# Patient Record
Sex: MALE
[De-identification: names, ages, dates, MRNs, and addresses within clinical notes are randomized per-mention and may not be internally consistent; named-entity substitution may affect disease eponyms.]

---

## 2020-04-07 ENCOUNTER — APPOINTMENT (OUTPATIENT)
Dept: INTERNAL MEDICINE | Facility: CLINIC | Age: 65
End: 2020-04-07

## 2020-05-18 ENCOUNTER — NON-APPOINTMENT (OUTPATIENT)
Age: 65
End: 2020-05-18

## 2020-05-18 ENCOUNTER — APPOINTMENT (OUTPATIENT)
Dept: INTERNAL MEDICINE | Facility: CLINIC | Age: 65
End: 2020-05-18
Payer: COMMERCIAL

## 2020-05-18 VITALS
BODY MASS INDEX: 25.47 KG/M2 | OXYGEN SATURATION: 75 % | DIASTOLIC BLOOD PRESSURE: 70 MMHG | WEIGHT: 188 LBS | HEIGHT: 72 IN | TEMPERATURE: 97.9 F | SYSTOLIC BLOOD PRESSURE: 148 MMHG

## 2020-05-18 VITALS — SYSTOLIC BLOOD PRESSURE: 118 MMHG | DIASTOLIC BLOOD PRESSURE: 78 MMHG

## 2020-05-18 DIAGNOSIS — Z80.42 FAMILY HISTORY OF MALIGNANT NEOPLASM OF PROSTATE: ICD-10-CM

## 2020-05-18 DIAGNOSIS — R80.9 PROTEINURIA, UNSPECIFIED: ICD-10-CM

## 2020-05-18 DIAGNOSIS — Z11.59 ENCOUNTER FOR SCREENING FOR OTHER VIRAL DISEASES: ICD-10-CM

## 2020-05-18 DIAGNOSIS — Z78.9 OTHER SPECIFIED HEALTH STATUS: ICD-10-CM

## 2020-05-18 DIAGNOSIS — Z87.448 PERSONAL HISTORY OF OTHER DISEASES OF URINARY SYSTEM: ICD-10-CM

## 2020-05-18 DIAGNOSIS — M25.512 PAIN IN LEFT SHOULDER: ICD-10-CM

## 2020-05-18 LAB
ALBUMIN SERPL ELPH-MCNC: 4.6 G/DL
ALP BLD-CCNC: 68 U/L
ALT SERPL-CCNC: 9 U/L
ANION GAP SERPL CALC-SCNC: 14 MMOL/L
AST SERPL-CCNC: 13 U/L
BASOPHILS # BLD AUTO: 0.03 K/UL
BASOPHILS NFR BLD AUTO: 0.3 %
BILIRUB SERPL-MCNC: 0.8 MG/DL
BILIRUB UR QL STRIP: NORMAL
BUN SERPL-MCNC: 31 MG/DL
CALCIUM SERPL-MCNC: 9.8 MG/DL
CHLORIDE SERPL-SCNC: 102 MMOL/L
CHOLEST SERPL-MCNC: 142 MG/DL
CHOLEST/HDLC SERPL: 2 RATIO
CLARITY UR: NORMAL
CO2 SERPL-SCNC: 27 MMOL/L
COLLECTION METHOD: NORMAL
CREAT SERPL-MCNC: 1.09 MG/DL
EOSINOPHIL # BLD AUTO: 0.08 K/UL
EOSINOPHIL NFR BLD AUTO: 0.8 %
GLUCOSE SERPL-MCNC: 106 MG/DL
GLUCOSE UR-MCNC: NORMAL
HCG UR QL: 1 EU/DL
HCT VFR BLD CALC: 48.2 %
HDLC SERPL-MCNC: 71 MG/DL
HGB BLD-MCNC: 15.3 G/DL
HGB UR QL STRIP.AUTO: NORMAL
IMM GRANULOCYTES NFR BLD AUTO: 0.5 %
KETONES UR-MCNC: NORMAL
LDLC SERPL CALC-MCNC: 59 MG/DL
LEUKOCYTE ESTERASE UR QL STRIP: NORMAL
LYMPHOCYTES # BLD AUTO: 1.62 K/UL
LYMPHOCYTES NFR BLD AUTO: 16.8 %
MAN DIFF?: NORMAL
MCHC RBC-ENTMCNC: 29 PG
MCHC RBC-ENTMCNC: 31.7 GM/DL
MCV RBC AUTO: 91.5 FL
MONOCYTES # BLD AUTO: 1.05 K/UL
MONOCYTES NFR BLD AUTO: 10.9 %
NEUTROPHILS # BLD AUTO: 6.81 K/UL
NEUTROPHILS NFR BLD AUTO: 70.7 %
NITRITE UR QL STRIP: NORMAL
PH UR STRIP: 5
PLATELET # BLD AUTO: 312 K/UL
POTASSIUM SERPL-SCNC: 4.6 MMOL/L
PROT SERPL-MCNC: 7.6 G/DL
PROT UR STRIP-MCNC: NORMAL
PSA SERPL-MCNC: 0.68 NG/ML
RBC # BLD: 5.27 M/UL
RBC # FLD: 13 %
SODIUM SERPL-SCNC: 143 MMOL/L
SP GR UR STRIP: 1.03
TRIGL SERPL-MCNC: 63 MG/DL
WBC # FLD AUTO: 9.64 K/UL

## 2020-05-18 PROCEDURE — G0009: CPT

## 2020-05-18 PROCEDURE — 36415 COLL VENOUS BLD VENIPUNCTURE: CPT

## 2020-05-18 PROCEDURE — 99204 OFFICE O/P NEW MOD 45 MIN: CPT | Mod: 25

## 2020-05-18 PROCEDURE — 90670 PCV13 VACCINE IM: CPT

## 2020-05-18 PROCEDURE — 93000 ELECTROCARDIOGRAM COMPLETE: CPT | Mod: 59

## 2020-05-18 PROCEDURE — 81003 URINALYSIS AUTO W/O SCOPE: CPT | Mod: QW

## 2020-05-18 PROCEDURE — G0444 DEPRESSION SCREEN ANNUAL: CPT | Mod: 59

## 2020-05-18 NOTE — HEALTH RISK ASSESSMENT
[Good] : ~his/her~  mood as  good [No falls in past year] : Patient reported no falls in the past year [Yes] : Yes [0] : 1) Little interest or pleasure doing things: Not at all (0) [None] : None [] :  [] : No [de-identified] : exercises [de-identified] : good [Change in mental status noted] : No change in mental status noted [Reports changes in dental health] : Reports no changes in dental health [Reports changes in hearing] : Reports no changes in hearing

## 2020-05-19 LAB
APPEARANCE: ABNORMAL
BACTERIA: NEGATIVE
BILIRUBIN URINE: ABNORMAL
BLOOD URINE: NORMAL
COLOR: ABNORMAL
GLUCOSE QUALITATIVE U: NEGATIVE
HYALINE CASTS: 0 /LPF
KETONES URINE: ABNORMAL
LEUKOCYTE ESTERASE URINE: NEGATIVE
MICROSCOPIC-UA: NORMAL
NITRITE URINE: NEGATIVE
PH URINE: 6
PROTEIN URINE: NORMAL
RED BLOOD CELLS URINE: 2 /HPF
SARS-COV-2 IGG SERPL IA-ACNC: 0.1 INDEX
SARS-COV-2 IGG SERPL QL IA: NEGATIVE
SPECIFIC GRAVITY URINE: >=1.03
SQUAMOUS EPITHELIAL CELLS: 1 /HPF
URINE COMMENTS: NORMAL
UROBILINOGEN URINE: NORMAL
WHITE BLOOD CELLS URINE: 1 /HPF

## 2020-05-20 LAB
ESTIMATED AVERAGE GLUCOSE: 103 MG/DL
HBA1C MFR BLD HPLC: 5.2 %

## 2020-06-02 ENCOUNTER — LABORATORY RESULT (OUTPATIENT)
Age: 65
End: 2020-06-02

## 2020-06-03 ENCOUNTER — APPOINTMENT (OUTPATIENT)
Dept: DERMATOLOGY | Facility: CLINIC | Age: 65
End: 2020-06-03
Payer: COMMERCIAL

## 2020-06-03 ENCOUNTER — APPOINTMENT (OUTPATIENT)
Dept: INTERNAL MEDICINE | Facility: CLINIC | Age: 65
End: 2020-06-03
Payer: COMMERCIAL

## 2020-06-03 VITALS — HEIGHT: 72 IN | BODY MASS INDEX: 24.24 KG/M2 | WEIGHT: 179 LBS

## 2020-06-03 VITALS
DIASTOLIC BLOOD PRESSURE: 80 MMHG | WEIGHT: 179 LBS | BODY MASS INDEX: 24.24 KG/M2 | HEART RATE: 73 BPM | SYSTOLIC BLOOD PRESSURE: 133 MMHG | OXYGEN SATURATION: 96 % | HEIGHT: 72 IN | TEMPERATURE: 96.4 F

## 2020-06-03 VITALS — TEMPERATURE: 96.8 F

## 2020-06-03 DIAGNOSIS — Z87.2 PERSONAL HISTORY OF DISEASES OF THE SKIN AND SUBCUTANEOUS TISSUE: ICD-10-CM

## 2020-06-03 DIAGNOSIS — Z85.828 PERSONAL HISTORY OF OTHER MALIGNANT NEOPLASM OF SKIN: ICD-10-CM

## 2020-06-03 DIAGNOSIS — R61 GENERALIZED HYPERHIDROSIS: ICD-10-CM

## 2020-06-03 DIAGNOSIS — D48.5 NEOPLASM OF UNCERTAIN BEHAVIOR OF SKIN: ICD-10-CM

## 2020-06-03 DIAGNOSIS — L30.9 DERMATITIS, UNSPECIFIED: ICD-10-CM

## 2020-06-03 DIAGNOSIS — Z84.0 FAMILY HISTORY OF DISEASES OF THE SKIN AND SUBCUTANEOUS TISSUE: ICD-10-CM

## 2020-06-03 PROCEDURE — 99214 OFFICE O/P EST MOD 30 MIN: CPT | Mod: 25

## 2020-06-03 PROCEDURE — 11105 PUNCH BX SKIN EA SEP/ADDL: CPT | Mod: 59

## 2020-06-03 PROCEDURE — 11104 PUNCH BX SKIN SINGLE LESION: CPT | Mod: 59

## 2020-06-03 PROCEDURE — 99204 OFFICE O/P NEW MOD 45 MIN: CPT | Mod: 25

## 2020-06-03 PROCEDURE — 17003 DESTRUCT PREMALG LES 2-14: CPT | Mod: 59

## 2020-06-03 PROCEDURE — 17000 DESTRUCT PREMALG LESION: CPT | Mod: 59

## 2020-06-03 PROCEDURE — 36415 COLL VENOUS BLD VENIPUNCTURE: CPT

## 2020-06-03 NOTE — HISTORY OF PRESENT ILLNESS
[FreeTextEntry1] : Having "stomach problems" Has been a slow gradual build up fro 3 weeks. lost 20 pounds Watery BM's appetite is poor. Lower abdominal pain Burning feeling. and night sweats.  He tried tagamet helped gas somewhat.  began after eating [employer] out. \par No recent antibiotic use, Low grade fever 101. he never had colonoscopy. Cologuard test was negative. No nausea or vomiting. he has a fear of eating. \par  he ahs otherwise been healthy most of his life

## 2020-06-03 NOTE — PHYSICAL EXAM
[General Appearance - Alert] : alert [Sclera] : the sclera and conjunctiva were normal [General Appearance - In No Acute Distress] : in no acute distress [Extraocular Movements] : extraocular movements were intact [PERRL With Normal Accommodation] : pupils were equal in size, round, and reactive to light [Outer Ear] : the ears and nose were normal in appearance [Oropharynx] : the oropharynx was normal [Neck Appearance] : the appearance of the neck was normal [Neck Cervical Mass (___cm)] : no neck mass was observed [Thyroid Diffuse Enlargement] : the thyroid was not enlarged [Jugular Venous Distention Increased] : there was no jugular-venous distention [Thyroid Nodule] : there were no palpable thyroid nodules [Auscultation Breath Sounds / Voice Sounds] : lungs were clear to auscultation bilaterally [Heart Rate And Rhythm] : heart rate was normal and rhythm regular [Heart Sounds] : normal S1 and S2 [Heart Sounds Gallop] : no gallops [Heart Sounds Pericardial Friction Rub] : no pericardial rub [Murmurs] : no murmurs [Full Pulse] : the pedal pulses are present [Edema] : there was no peripheral edema [Breast Palpation Mass] : no palpable masses [Breast Appearance] : normal in appearance [Abdomen Soft] : soft [Bowel Sounds] : normal bowel sounds [Abdomen Mass (___ Cm)] : no abdominal mass palpated [] : no hepato-splenomegaly [Abdomen Tenderness] : non-tender [Penis Abnormality] : the penis was normal [Scrotum] : the scrotum was normal [Testes Mass (___cm)] : there were no testicular masses [Testes Swelling] : the testicles were not swollen [Cervical Lymph Nodes Enlarged Posterior Bilaterally] : posterior cervical [Cervical Lymph Nodes Enlarged Anterior Bilaterally] : anterior cervical [Axillary Lymph Nodes Enlarged Bilaterally] : axillary [Femoral Lymph Nodes Enlarged Bilaterally] : femoral [Inguinal Lymph Nodes Enlarged Bilaterally] : inguinal [Supraclavicular Lymph Nodes Enlarged Bilaterally] : supraclavicular [No CVA Tenderness] : no ~M costovertebral angle tenderness [Abnormal Walk] : normal gait [No Spinal Tenderness] : no spinal tenderness [Nail Clubbing] : no clubbing  or cyanosis of the fingernails [Musculoskeletal - Swelling] : no joint swelling seen [Motor Tone] : muscle strength and tone were normal [FreeTextEntry1] : rash on chest wall [Deep Tendon Reflexes (DTR)] : deep tendon reflexes were 2+ and symmetric [Sensation] : the sensory exam was normal to light touch and pinprick [No Focal Deficits] : no focal deficits [Oriented To Time, Place, And Person] : oriented to person, place, and time [Impaired Insight] : insight and judgment were intact [Affect] : the affect was normal

## 2020-06-03 NOTE — ASSESSMENT
[FreeTextEntry1] : unexplained weight loss with abdominal pain and fever with chills and night sweats .

## 2020-06-04 LAB
ALBUMIN SERPL ELPH-MCNC: 3.9 G/DL
ALP BLD-CCNC: 87 U/L
ALT SERPL-CCNC: 41 U/L
ANION GAP SERPL CALC-SCNC: 15 MMOL/L
AST SERPL-CCNC: 28 U/L
BASOPHILS # BLD AUTO: 0.05 K/UL
BASOPHILS NFR BLD AUTO: 0.5 %
BILIRUB SERPL-MCNC: 0.3 MG/DL
BUN SERPL-MCNC: 24 MG/DL
CALCIUM SERPL-MCNC: 9.7 MG/DL
CHLORIDE SERPL-SCNC: 101 MMOL/L
CO2 SERPL-SCNC: 25 MMOL/L
CREAT SERPL-MCNC: 0.84 MG/DL
CRP SERPL-MCNC: 13.29 MG/DL
EOSINOPHIL # BLD AUTO: 0.08 K/UL
EOSINOPHIL NFR BLD AUTO: 0.7 %
ERYTHROCYTE [SEDIMENTATION RATE] IN BLOOD BY WESTERGREN METHOD: 60 MM/HR
GLUCOSE SERPL-MCNC: 114 MG/DL
HCT VFR BLD CALC: 47.6 %
HGB BLD-MCNC: 14.7 G/DL
IMM GRANULOCYTES NFR BLD AUTO: 0.6 %
LYMPHOCYTES # BLD AUTO: 1.81 K/UL
LYMPHOCYTES NFR BLD AUTO: 16.4 %
MAN DIFF?: NORMAL
MCHC RBC-ENTMCNC: 28.6 PG
MCHC RBC-ENTMCNC: 30.9 GM/DL
MCV RBC AUTO: 92.6 FL
MONOCYTES # BLD AUTO: 1.04 K/UL
MONOCYTES NFR BLD AUTO: 9.4 %
NEUTROPHILS # BLD AUTO: 7.96 K/UL
NEUTROPHILS NFR BLD AUTO: 72.4 %
PLATELET # BLD AUTO: 447 K/UL
POTASSIUM SERPL-SCNC: 4.6 MMOL/L
PROT SERPL-MCNC: 7.3 G/DL
RBC # BLD: 5.14 M/UL
RBC # FLD: 12.3 %
SAVE SPECIMEN: NORMAL
SODIUM SERPL-SCNC: 141 MMOL/L
WBC # FLD AUTO: 11.01 K/UL

## 2020-06-06 RX ORDER — AZITHROMYCIN 500 MG/1
500 TABLET, FILM COATED ORAL DAILY
Qty: 1 | Refills: 0 | Status: DISCONTINUED | COMMUNITY
Start: 2020-06-06 | End: 2020-06-06

## 2020-06-06 RX ORDER — AMOXICILLIN AND CLAVULANATE POTASSIUM 875; 125 MG/1; MG/1
875-125 TABLET, COATED ORAL
Qty: 20 | Refills: 0 | Status: DISCONTINUED | COMMUNITY
Start: 2020-06-04 | End: 2020-06-06

## 2020-06-08 LAB
C DIFF TOX GENS STL QL NAA+PROBE: NORMAL
CDIFF BY PCR: NOT DETECTED
GI PCR PANEL, STOOL: NORMAL

## 2020-06-11 ENCOUNTER — OUTPATIENT (OUTPATIENT)
Dept: OUTPATIENT SERVICES | Facility: HOSPITAL | Age: 65
LOS: 1 days | End: 2020-06-11
Payer: COMMERCIAL

## 2020-06-11 ENCOUNTER — APPOINTMENT (OUTPATIENT)
Dept: CT IMAGING | Facility: CLINIC | Age: 65
End: 2020-06-11
Payer: COMMERCIAL

## 2020-06-11 DIAGNOSIS — R10.9 UNSPECIFIED ABDOMINAL PAIN: ICD-10-CM

## 2020-06-11 PROCEDURE — 74177 CT ABD & PELVIS W/CONTRAST: CPT

## 2020-06-11 PROCEDURE — 74177 CT ABD & PELVIS W/CONTRAST: CPT | Mod: 26

## 2020-06-18 ENCOUNTER — APPOINTMENT (OUTPATIENT)
Dept: INTERNAL MEDICINE | Facility: CLINIC | Age: 65
End: 2020-06-18

## 2020-06-19 ENCOUNTER — APPOINTMENT (OUTPATIENT)
Dept: COLORECTAL SURGERY | Facility: CLINIC | Age: 65
End: 2020-06-19
Payer: COMMERCIAL

## 2020-06-19 VITALS
DIASTOLIC BLOOD PRESSURE: 73 MMHG | RESPIRATION RATE: 14 BRPM | HEART RATE: 65 BPM | OXYGEN SATURATION: 97 % | TEMPERATURE: 98.4 F | HEIGHT: 72 IN | SYSTOLIC BLOOD PRESSURE: 139 MMHG | BODY MASS INDEX: 24.92 KG/M2 | WEIGHT: 184 LBS

## 2020-06-19 DIAGNOSIS — Z80.8 FAMILY HISTORY OF MALIGNANT NEOPLASM OF OTHER ORGANS OR SYSTEMS: ICD-10-CM

## 2020-06-19 DIAGNOSIS — Z82.49 FAMILY HISTORY OF ISCHEMIC HEART DISEASE AND OTHER DISEASES OF THE CIRCULATORY SYSTEM: ICD-10-CM

## 2020-06-19 DIAGNOSIS — M50.223 OTHER CERVICAL DISC DISPLACEMENT AT C6-C7 LEVEL: ICD-10-CM

## 2020-06-19 PROCEDURE — 99204 OFFICE O/P NEW MOD 45 MIN: CPT

## 2020-06-19 NOTE — PHYSICAL EXAM
[Normal Breath Sounds] : Normal breath sounds [Normal Heart Sounds] : normal heart sounds [Normal Rate and Rhythm] : normal rate and rhythm [Oriented to Place] : oriented to place [Oriented to Person] : oriented to person [Alert] : alert [Oriented to Time] : oriented to time [Calm] : calm [Exam Deferred] : exam was deferred [de-identified] : well appearing [de-identified] : soft, +BS, NT/ND [de-identified] : NC/AT [de-identified] : intact [de-identified] : +ROM/ROLANDO

## 2020-06-19 NOTE — HISTORY OF PRESENT ILLNESS
[FreeTextEntry1] : Patient is a 64 yo male here to discuss diverticulitis episode. 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 He developed abdominal cramping, bloating, gas, constipation, diarrhea, night sweats, had decreased appetite. Patient reports a weight loss of 10-15 pounds in the last months since this began Saw Dr. Francisco was prescribed a course of abx and had CT of abdomen 6/11 showing diverticulitis. Patient has never had a colonoscopy, last cologuard in 2016 was negative. \par He currently has no abdominal pain and is having normal formed BMs

## 2020-06-19 NOTE — ASSESSMENT
[FreeTextEntry1] : Pt has clinically improved significantly on antibiotics. He has no symptoms of a SB-colonic fistula (diarrhea or watery stool). \par I recommend a colonoscopy for which he will f/u with Dr. Francisco. If colonoscopy is normal and pt remains asymptomatic I would continue to monitor. If he develops recurrent symptoms then I would repeat a CT scan with rectal contrast to eval for fistula. I would not recommend surgery at this point unless there is a concerning finding on colonoscopy or he has another episode of diverticulitis, or develops symptoms from a fistula.

## 2020-07-20 LAB
ALBUMIN MFR SERPL ELPH: 58.9 %
ALBUMIN SERPL-MCNC: 3.9 G/DL
ALBUMIN/GLOB SERPL: 1.4 RATIO
ALPHA1 GLOB MFR SERPL ELPH: 4.2 %
ALPHA1 GLOB SERPL ELPH-MCNC: 0.3 G/DL
ALPHA2 GLOB MFR SERPL ELPH: 9.9 %
ALPHA2 GLOB SERPL ELPH-MCNC: 0.7 G/DL
B-GLOBULIN MFR SERPL ELPH: 10.7 %
B-GLOBULIN SERPL ELPH-MCNC: 0.7 G/DL
GAMMA GLOB FLD ELPH-MCNC: 1.1 G/DL
GAMMA GLOB MFR SERPL ELPH: 16.3 %
INTERPRETATION SERPL IEP-IMP: NORMAL
PROT SERPL-MCNC: 6.7 G/DL
PROT SERPL-MCNC: 6.7 G/DL

## 2020-07-22 ENCOUNTER — APPOINTMENT (OUTPATIENT)
Dept: DERMATOLOGY | Facility: CLINIC | Age: 65
End: 2020-07-22
Payer: COMMERCIAL

## 2020-07-22 VITALS — TEMPERATURE: 97.1 F

## 2020-07-22 PROCEDURE — 99213 OFFICE O/P EST LOW 20 MIN: CPT

## 2020-08-03 DIAGNOSIS — Z12.11 ENCOUNTER FOR SCREENING FOR MALIGNANT NEOPLASM OF COLON: ICD-10-CM

## 2020-08-05 ENCOUNTER — TRANSCRIPTION ENCOUNTER (OUTPATIENT)
Age: 65
End: 2020-08-05

## 2020-08-07 ENCOUNTER — APPOINTMENT (OUTPATIENT)
Dept: INTERNAL MEDICINE | Facility: AMBULATORY MEDICAL SERVICES | Age: 65
End: 2020-08-07
Payer: COMMERCIAL

## 2020-08-07 ENCOUNTER — RESULT REVIEW (OUTPATIENT)
Age: 65
End: 2020-08-07

## 2020-08-07 PROCEDURE — 45380 COLONOSCOPY AND BIOPSY: CPT | Mod: PT

## 2020-09-16 ENCOUNTER — APPOINTMENT (OUTPATIENT)
Dept: INTERNAL MEDICINE | Facility: CLINIC | Age: 65
End: 2020-09-16
Payer: COMMERCIAL

## 2020-09-16 VITALS
DIASTOLIC BLOOD PRESSURE: 74 MMHG | HEART RATE: 64 BPM | WEIGHT: 184 LBS | SYSTOLIC BLOOD PRESSURE: 141 MMHG | HEIGHT: 72 IN | BODY MASS INDEX: 24.92 KG/M2

## 2020-09-16 PROCEDURE — 99213 OFFICE O/P EST LOW 20 MIN: CPT

## 2020-09-16 NOTE — PHYSICAL EXAM
[No Acute Distress] : no acute distress [Well Nourished] : well nourished [Well Developed] : well developed [Well-Appearing] : well-appearing [Normal Sclera/Conjunctiva] : normal sclera/conjunctiva [PERRL] : pupils equal round and reactive to light [EOMI] : extraocular movements intact [Normal Oropharynx] : the oropharynx was normal [Normal Outer Ear/Nose] : the outer ears and nose were normal in appearance [No JVD] : no jugular venous distention [No Lymphadenopathy] : no lymphadenopathy [Thyroid Normal, No Nodules] : the thyroid was normal and there were no nodules present [No Respiratory Distress] : no respiratory distress  [Supple] : supple [No Accessory Muscle Use] : no accessory muscle use [Clear to Auscultation] : lungs were clear to auscultation bilaterally [Normal Rate] : normal rate  [Regular Rhythm] : with a regular rhythm [No Carotid Bruits] : no carotid bruits [Normal S1, S2] : normal S1 and S2 [No Murmur] : no murmur heard [No Varicosities] : no varicosities [No Abdominal Bruit] : a ~M bruit was not heard ~T in the abdomen [Pedal Pulses Present] : the pedal pulses are present [No Edema] : there was no peripheral edema [No Palpable Aorta] : no palpable aorta [No Extremity Clubbing/Cyanosis] : no extremity clubbing/cyanosis [Soft] : abdomen soft [Non-distended] : non-distended [Non Tender] : non-tender [No HSM] : no HSM [No Masses] : no abdominal mass palpated [Normal Bowel Sounds] : normal bowel sounds [Normal Posterior Cervical Nodes] : no posterior cervical lymphadenopathy [Normal Anterior Cervical Nodes] : no anterior cervical lymphadenopathy [No CVA Tenderness] : no CVA  tenderness [No Spinal Tenderness] : no spinal tenderness [No Joint Swelling] : no joint swelling [Grossly Normal Strength/Tone] : grossly normal strength/tone [No Rash] : no rash [No Focal Deficits] : no focal deficits [Coordination Grossly Intact] : coordination grossly intact [Normal Gait] : normal gait [Normal Insight/Judgement] : insight and judgment were intact [Normal Affect] : the affect was normal

## 2020-09-16 NOTE — HISTORY OF PRESENT ILLNESS
[FreeTextEntry1] : Diverticulitis in June took clindamycin for 2 weeks and got netter. Had colonscopy in August , and since then he has had only loose BM's

## 2020-09-18 LAB
C DIFF TOX GENS STL QL NAA+PROBE: NORMAL
CDIFF BY PCR: DETECTED

## 2020-10-01 RX ORDER — SODIUM PICOSULFATE, MAGNESIUM OXIDE, AND ANHYDROUS CITRIC ACID 10; 3.5; 12 MG/160ML; G/160ML; G/160ML
10-3.5-12 MG-GM LIQUID ORAL
Qty: 1 | Refills: 0 | Status: DISCONTINUED | COMMUNITY
Start: 2020-08-03 | End: 2020-10-01

## 2020-10-01 RX ORDER — CLINDAMYCIN HYDROCHLORIDE 300 MG/1
300 CAPSULE ORAL 3 TIMES DAILY
Qty: 60 | Refills: 0 | Status: DISCONTINUED | COMMUNITY
Start: 2020-06-11 | End: 2020-10-01

## 2020-10-01 RX ORDER — AMOXICILLIN 500 MG/1
500 TABLET, FILM COATED ORAL EVERY 8 HOURS
Qty: 21 | Refills: 0 | Status: DISCONTINUED | COMMUNITY
Start: 2020-06-06 | End: 2020-10-01

## 2020-10-07 LAB — GI PCR PANEL, STOOL: NORMAL

## 2020-10-12 LAB
C DIFF TOX GENS STL QL NAA+PROBE: NORMAL
CDIFF BY PCR: NOT DETECTED

## 2020-10-15 ENCOUNTER — APPOINTMENT (OUTPATIENT)
Dept: CT IMAGING | Facility: CLINIC | Age: 65
End: 2020-10-15
Payer: COMMERCIAL

## 2020-10-15 ENCOUNTER — OUTPATIENT (OUTPATIENT)
Dept: OUTPATIENT SERVICES | Facility: HOSPITAL | Age: 65
LOS: 1 days | End: 2020-10-15
Payer: COMMERCIAL

## 2020-10-15 ENCOUNTER — RESULT REVIEW (OUTPATIENT)
Age: 65
End: 2020-10-15

## 2020-10-15 DIAGNOSIS — K57.32 DIVERTICULITIS OF LARGE INTESTINE WITHOUT PERFORATION OR ABSCESS WITHOUT BLEEDING: ICD-10-CM

## 2020-10-15 PROCEDURE — 74177 CT ABD & PELVIS W/CONTRAST: CPT | Mod: 26

## 2020-10-15 PROCEDURE — 82565 ASSAY OF CREATININE: CPT

## 2020-10-15 PROCEDURE — 74177 CT ABD & PELVIS W/CONTRAST: CPT

## 2020-10-19 ENCOUNTER — APPOINTMENT (OUTPATIENT)
Dept: ORTHOPEDIC SURGERY | Facility: CLINIC | Age: 65
End: 2020-10-19

## 2020-10-21 RX ORDER — VANCOMYCIN HYDROCHLORIDE 250 MG/1
250 CAPSULE ORAL
Qty: 40 | Refills: 0 | Status: DISCONTINUED | COMMUNITY
Start: 2020-09-18 | End: 2020-10-21

## 2020-10-23 ENCOUNTER — APPOINTMENT (OUTPATIENT)
Dept: ORTHOPEDIC SURGERY | Facility: CLINIC | Age: 65
End: 2020-10-23

## 2020-11-05 ENCOUNTER — APPOINTMENT (OUTPATIENT)
Dept: INTERNAL MEDICINE | Facility: CLINIC | Age: 65
End: 2020-11-05
Payer: COMMERCIAL

## 2020-11-05 ENCOUNTER — LABORATORY RESULT (OUTPATIENT)
Age: 65
End: 2020-11-05

## 2020-11-05 VITALS
SYSTOLIC BLOOD PRESSURE: 146 MMHG | BODY MASS INDEX: 25.47 KG/M2 | WEIGHT: 188 LBS | DIASTOLIC BLOOD PRESSURE: 70 MMHG | HEART RATE: 70 BPM | HEIGHT: 72 IN

## 2020-11-05 DIAGNOSIS — Z23 ENCOUNTER FOR IMMUNIZATION: ICD-10-CM

## 2020-11-05 DIAGNOSIS — K90.0 CELIAC DISEASE: ICD-10-CM

## 2020-11-05 PROCEDURE — 90662 IIV NO PRSV INCREASED AG IM: CPT

## 2020-11-05 PROCEDURE — 99072 ADDL SUPL MATRL&STAF TM PHE: CPT

## 2020-11-05 PROCEDURE — 99214 OFFICE O/P EST MOD 30 MIN: CPT | Mod: 25

## 2020-11-05 PROCEDURE — G0008: CPT

## 2020-11-05 PROCEDURE — 36415 COLL VENOUS BLD VENIPUNCTURE: CPT

## 2020-11-05 NOTE — HISTORY OF PRESENT ILLNESS
[FreeTextEntry1] : 6/2020 - Diverticulitis - treated with antibiotics initially started Augmentin Got sick from it. Then just amoxicillin and eventually p[laced on Clindamycin and got better.  CT scan showed major inflammation- but he eventually got better,. Had colonoscopy which was normal. after the colonoscopy - developed diarrhea and diagnosed with c.dif colitis and treated Vancomycin. repeat C.dif was negative  But currently he is still having watery diarrhea with clear mucous. 6-8 loose BM's a day mostly in AM had repeat CT 10/12. resolved collections and abcesses , but still soft tissue thickening /inflammatory changes adjacent to sigmoid colon. No pain or fever

## 2020-11-10 ENCOUNTER — LABORATORY RESULT (OUTPATIENT)
Age: 65
End: 2020-11-10

## 2020-11-13 LAB
ALBUMIN SERPL ELPH-MCNC: 4.2 G/DL
ALP BLD-CCNC: 78 U/L
ALT SERPL-CCNC: 8 U/L
ANION GAP SERPL CALC-SCNC: 12 MMOL/L
AST SERPL-CCNC: 12 U/L
BAKER'S YEAST AB QL: 8.9 UNITS
BAKER'S YEAST IGA QL IA: 6.2 UNITS
BAKER'S YEAST IGA QN IA: NEGATIVE
BAKER'S YEAST IGG QN IA: NEGATIVE
BASOPHILS # BLD AUTO: 0.03 K/UL
BASOPHILS NFR BLD AUTO: 0.3 %
BILIRUB SERPL-MCNC: 0.6 MG/DL
BUN SERPL-MCNC: 27 MG/DL
CALCIUM SERPL-MCNC: 9.3 MG/DL
CHLORIDE SERPL-SCNC: 100 MMOL/L
CO2 SERPL-SCNC: 28 MMOL/L
CREAT SERPL-MCNC: 0.92 MG/DL
CRP SERPL-MCNC: 4.81 MG/DL
EOSINOPHIL # BLD AUTO: 0.18 K/UL
EOSINOPHIL NFR BLD AUTO: 2 %
ERYTHROCYTE [SEDIMENTATION RATE] IN BLOOD BY WESTERGREN METHOD: 45 MM/HR
GLUCOSE SERPL-MCNC: 90 MG/DL
HCT VFR BLD CALC: 43.4 %
HGB BLD-MCNC: 13.8 G/DL
IGA SER QL IEP: 269 MG/DL
IMM GRANULOCYTES NFR BLD AUTO: 0.3 %
LYMPHOCYTES # BLD AUTO: 2.34 K/UL
LYMPHOCYTES NFR BLD AUTO: 26.4 %
MAN DIFF?: NORMAL
MCHC RBC-ENTMCNC: 28 PG
MCHC RBC-ENTMCNC: 31.8 GM/DL
MCV RBC AUTO: 88 FL
MONOCYTES # BLD AUTO: 0.82 K/UL
MONOCYTES NFR BLD AUTO: 9.3 %
MPO AB + PR3 PNL SER: NORMAL
NEUTROPHILS # BLD AUTO: 5.45 K/UL
NEUTROPHILS NFR BLD AUTO: 61.7 %
PLATELET # BLD AUTO: 320 K/UL
POTASSIUM SERPL-SCNC: 4.3 MMOL/L
PROT SERPL-MCNC: 7.1 G/DL
RBC # BLD: 4.93 M/UL
RBC # FLD: 13.4 %
SODIUM SERPL-SCNC: 140 MMOL/L
TTG IGA SER IA-ACNC: <1.2 U/ML
TTG IGA SER-ACNC: NEGATIVE
TTG IGG SER IA-ACNC: 6.9 U/ML
TTG IGG SER IA-ACNC: ABNORMAL
WBC # FLD AUTO: 8.85 K/UL

## 2020-11-25 ENCOUNTER — APPOINTMENT (OUTPATIENT)
Dept: INTERNAL MEDICINE | Facility: CLINIC | Age: 65
End: 2020-11-25
Payer: COMMERCIAL

## 2020-11-25 VITALS
BODY MASS INDEX: 25.47 KG/M2 | HEIGHT: 72 IN | WEIGHT: 188 LBS | SYSTOLIC BLOOD PRESSURE: 135 MMHG | DIASTOLIC BLOOD PRESSURE: 74 MMHG | HEART RATE: 56 BPM

## 2020-11-25 PROCEDURE — 36415 COLL VENOUS BLD VENIPUNCTURE: CPT

## 2020-11-25 PROCEDURE — 99214 OFFICE O/P EST MOD 30 MIN: CPT | Mod: 25

## 2020-11-25 NOTE — PLAN
[FreeTextEntry1] : patient made aware to watch for reccurrence of c.dif. and to consider in the future sigmoid colon

## 2020-11-25 NOTE — HISTORY OF PRESENT ILLNESS
[FreeTextEntry1] : completed course of antibiotics on Monday. No more diarrhea, no more fever chills . \par

## 2020-11-30 LAB
BASOPHILS # BLD AUTO: 0.04 K/UL
BASOPHILS NFR BLD AUTO: 0.5 %
CRP SERPL-MCNC: 1.9 MG/DL
EOSINOPHIL # BLD AUTO: 0.16 K/UL
EOSINOPHIL NFR BLD AUTO: 2 %
ERYTHROCYTE [SEDIMENTATION RATE] IN BLOOD BY WESTERGREN METHOD: 41 MM/HR
HCT VFR BLD CALC: 42.2 %
HGB BLD-MCNC: 13 G/DL
IMM GRANULOCYTES NFR BLD AUTO: 0.2 %
LYMPHOCYTES # BLD AUTO: 2.05 K/UL
LYMPHOCYTES NFR BLD AUTO: 25.6 %
MAN DIFF?: NORMAL
MCHC RBC-ENTMCNC: 28.5 PG
MCHC RBC-ENTMCNC: 30.8 GM/DL
MCV RBC AUTO: 92.5 FL
MONOCYTES # BLD AUTO: 0.74 K/UL
MONOCYTES NFR BLD AUTO: 9.2 %
NEUTROPHILS # BLD AUTO: 5 K/UL
NEUTROPHILS NFR BLD AUTO: 62.5 %
PLATELET # BLD AUTO: 379 K/UL
RBC # BLD: 4.56 M/UL
RBC # FLD: 14.1 %
WBC # FLD AUTO: 8.01 K/UL

## 2020-12-14 LAB
C DIFF TOX GENS STL QL NAA+PROBE: NORMAL
CDIFF BY PCR: NOT DETECTED

## 2020-12-21 ENCOUNTER — NON-APPOINTMENT (OUTPATIENT)
Age: 65
End: 2020-12-21

## 2020-12-23 PROBLEM — Z12.11 ENCOUNTER FOR SCREENING COLONOSCOPY: Status: RESOLVED | Noted: 2020-06-28 | Resolved: 2020-12-23

## 2021-01-19 ENCOUNTER — APPOINTMENT (OUTPATIENT)
Dept: GASTROENTEROLOGY | Facility: CLINIC | Age: 66
End: 2021-01-19
Payer: COMMERCIAL

## 2021-01-19 VITALS
DIASTOLIC BLOOD PRESSURE: 67 MMHG | HEART RATE: 68 BPM | WEIGHT: 192 LBS | TEMPERATURE: 97.3 F | HEIGHT: 72 IN | BODY MASS INDEX: 26.01 KG/M2 | SYSTOLIC BLOOD PRESSURE: 143 MMHG

## 2021-01-19 DIAGNOSIS — R21 RASH AND OTHER NONSPECIFIC SKIN ERUPTION: ICD-10-CM

## 2021-01-19 PROCEDURE — 82274 ASSAY TEST FOR BLOOD FECAL: CPT | Mod: QW

## 2021-01-19 PROCEDURE — 99204 OFFICE O/P NEW MOD 45 MIN: CPT

## 2021-01-19 PROCEDURE — 99072 ADDL SUPL MATRL&STAF TM PHE: CPT

## 2021-01-19 RX ORDER — TRIAMCINOLONE ACETONIDE 1 MG/G
0.1 OINTMENT TOPICAL
Qty: 1 | Refills: 1 | Status: DISCONTINUED | COMMUNITY
Start: 2020-06-03 | End: 2021-01-19

## 2021-01-19 RX ORDER — CEPHALEXIN 500 MG/1
500 CAPSULE ORAL
Qty: 40 | Refills: 0 | Status: DISCONTINUED | COMMUNITY
Start: 2020-11-13 | End: 2021-01-19

## 2021-01-19 NOTE — PHYSICAL EXAM
[General Appearance - Alert] : alert [General Appearance - In No Acute Distress] : in no acute distress [General Appearance - Well Nourished] : well nourished [General Appearance - Well Developed] : well developed [General Appearance - Well-Appearing] : healthy appearing [Sclera] : the sclera and conjunctiva were normal [Extraocular Movements] : extraocular movements were intact [PERRL With Normal Accommodation] : pupils were equal in size, round, and reactive to light [Strabismus] : no strabismus was seen [Optic Disc Abnormality] : the optic disc were normal in size and color [Outer Ear] : the ears and nose were normal in appearance [Retina] : no retinal hemorrhages, vessel changes or exudates seen on fundoscopic exam [Examination Of The Oral Cavity] : the lips and gums were normal [Both Tympanic Membranes Were Examined] : both tympanic membranes were normal [Nasal Cavity] : the nasal mucosa and septum were normal [Oropharynx] : the oropharynx was normal [Neck Appearance] : the appearance of the neck was normal [Neck Cervical Mass (___cm)] : no neck mass was observed [Jugular Venous Distention Increased] : there was no jugular-venous distention [Thyroid Diffuse Enlargement] : the thyroid was not enlarged [Thyroid Nodule] : there were no palpable thyroid nodules [Auscultation Breath Sounds / Voice Sounds] : lungs were clear to auscultation bilaterally [Lungs Percussion] : the lungs were normal to percussion [Heart Rate And Rhythm] : heart rate was normal and rhythm regular [Heart Sounds] : normal S1 and S2 [Heart Sounds Gallop] : no gallops [Murmurs] : no murmurs [Heart Sounds Pericardial Friction Rub] : no pericardial rub [Arterial Pulses Carotid] : carotid pulses were normal with no bruits [Abdominal Aorta] : the abdominal aorta was normal [Edema] : there was no peripheral edema [Full Pulse] : the pedal pulses are present [Veins - Varicosity Changes] : there were no varicosital changes [Abdomen Soft] : soft [Abdomen Tenderness] : non-tender [] : no hepato-splenomegaly [Abdomen Mass (___ Cm)] : no abdominal mass palpated [Abdomen Hernia] : no hernia was discovered [Normal Sphincter Tone] : normal sphincter tone [No Rectal Mass] : no rectal mass [Occult Blood Positive] : stool was negative for occult blood [Penis Abnormality] : the penis was normal [Scrotum] : the scrotum was normal [Testes Swelling] : the testicles were not swollen [Testes Mass (___cm)] : there were no testicular masses [Cervical Lymph Nodes Enlarged Posterior Bilaterally] : posterior cervical [Cervical Lymph Nodes Enlarged Anterior Bilaterally] : anterior cervical [Supraclavicular Lymph Nodes Enlarged Bilaterally] : supraclavicular [Axillary Lymph Nodes Enlarged Bilaterally] : axillary [Femoral Lymph Nodes Enlarged Bilaterally] : femoral [Inguinal Lymph Nodes Enlarged Bilaterally] : inguinal [No CVA Tenderness] : no ~M costovertebral angle tenderness [No Spinal Tenderness] : no spinal tenderness [Abnormal Walk] : normal gait [Nail Clubbing] : no clubbing  or cyanosis of the fingernails [Involuntary Movements] : no involuntary movements were seen [Musculoskeletal - Swelling] : no joint swelling seen [Motor Tone] : muscle strength and tone were normal [Skin Turgor] : normal skin turgor [Skin Color & Pigmentation] : normal skin color and pigmentation [FreeTextEntry1] : Erythematous groin rash bilaterally insistent with a fungal infection [No Focal Deficits] : no focal deficits [Oriented To Time, Place, And Person] : oriented to person, place, and time [Impaired Insight] : insight and judgment were intact [Affect] : the affect was normal

## 2021-01-19 NOTE — CONSULT LETTER
[Dear  ___] : Dear  [unfilled], [Consult Letter:] : I had the pleasure of evaluating your patient, [unfilled]. [( Thank you for referring [unfilled] for consultation for _____ )] : Thank you for referring [unfilled] for consultation for [unfilled] [Please see my note below.] : Please see my note below. [Consult Closing:] : Thank you very much for allowing me to participate in the care of this patient.  If you have any questions, please do not hesitate to contact me. [Sincerely,] : Sincerely, [FreeTextEntry3] : Anibal Louis MD

## 2021-01-19 NOTE — REVIEW OF SYSTEMS
[Recent Weight Gain (___ Lbs)] : recent [unfilled] ~Ulb weight gain [Abdominal Pain] : abdominal pain [Diarrhea] : diarrhea [Arthralgias] : arthralgias [Joint Pain] : joint pain [Joint Swelling] : joint swelling [Joint Stiffness] : joint stiffness [Limb Pain] : limb pain [Limb Swelling] : limb swelling [Negative] : Heme/Lymph

## 2021-01-19 NOTE — HISTORY OF PRESENT ILLNESS
[FreeTextEntry1] : This is a second opinion consultation for this 65-year-old gentleman who had a bout of sigmoid diverticulitis in 2020.  Initially, he was treated with Augmentin, but switched for a brief time to amoxicillin.  This was then finally switched to clindamycin, which he took for 10 days.  During the month of July, he was feeling well.  He then had a colonoscopy by Dr. Dean Francisco on 2020, which revealed a localized colitis in the sigmoid colon, presumably in the area of diverticulitis.  Following the colonoscopy, the patient developed severe diarrhea, which has been present until today.  Stool for C. difficile was positive on 2020.  The patient was treated with vancomycin for 10 days, but the diarrhea persisted.  Stools for other infections were negative and 3 subsequent stool for C. difficile were negative.  The patient saw Dr. Diams Chun, colorectal surgeon, in 2020.  His diarrhea occurs about 7-10 times a day, and he is currently wearing diapers.  He said that he is feeling a little bit better over the past 2 days.  There is no blood in the stool.  There is no prior gastrointestinal history.  The patient's health has been excellent.  He had a right inguinal herniorrhaphy in  with mesh.  His father  at 80 secondary to prostate cancer.  His mother  at 88 secondary to congestive heart failure.  His sister  at 68, most likely secondary to a heart attack, and she had COPD related to smoking.  He is , and his son and daughter are healthy.  He is a retired teacher.  He does not smoke and drinks 2 to 3 glasses of wine each night.  He denies drug allergies, but has an allergy to wheat bread.

## 2021-01-19 NOTE — ASSESSMENT
[FreeTextEntry1] : 1.  Status post acute sigmoid diverticulitis-rule out small bowel fistulization as the cause for the patient's diarrhea.  Rule out chronic diverticulitis.  Persistent C. difficile is still a possibility.  Inflammatory bowel disease and colon cancer are less likely.\par 2.  Diarrhea-rule out small bowel to colon fistula-status post C. difficile infection-rule out persistent C. difficile infection.\par 3.  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  After discussing this case with the radiologist, Dr. Jaun Childs, it was decided that a CT enterography would be the best procedure to look for a small bowel to colon fistula.  This test was requested.\par 2.  Nystatin-triamcinolone cream to be applied to the groin rash.  If this does not improve, the patient should see a dermatologist.\par 3.  Previous records, including blood tests, stool tests, CT scans and colonoscopy report reviewed in detail.

## 2021-01-21 ENCOUNTER — APPOINTMENT (OUTPATIENT)
Dept: CT IMAGING | Facility: CLINIC | Age: 66
End: 2021-01-21
Payer: COMMERCIAL

## 2021-01-21 ENCOUNTER — OUTPATIENT (OUTPATIENT)
Dept: OUTPATIENT SERVICES | Facility: HOSPITAL | Age: 66
LOS: 1 days | End: 2021-01-21
Payer: COMMERCIAL

## 2021-01-21 ENCOUNTER — RESULT REVIEW (OUTPATIENT)
Age: 66
End: 2021-01-21

## 2021-01-21 DIAGNOSIS — R19.7 DIARRHEA, UNSPECIFIED: ICD-10-CM

## 2021-01-21 DIAGNOSIS — Z00.8 ENCOUNTER FOR OTHER GENERAL EXAMINATION: ICD-10-CM

## 2021-01-21 PROCEDURE — 74177 CT ABD & PELVIS W/CONTRAST: CPT | Mod: 26

## 2021-01-21 PROCEDURE — 74177 CT ABD & PELVIS W/CONTRAST: CPT

## 2021-01-22 ENCOUNTER — NON-APPOINTMENT (OUTPATIENT)
Age: 66
End: 2021-01-22

## 2021-01-24 ENCOUNTER — NON-APPOINTMENT (OUTPATIENT)
Age: 66
End: 2021-01-24

## 2021-01-25 ENCOUNTER — APPOINTMENT (OUTPATIENT)
Dept: COLORECTAL SURGERY | Facility: CLINIC | Age: 66
End: 2021-01-25
Payer: COMMERCIAL

## 2021-01-25 VITALS — TEMPERATURE: 97.5 F

## 2021-01-25 PROCEDURE — 99072 ADDL SUPL MATRL&STAF TM PHE: CPT

## 2021-01-25 PROCEDURE — 99214 OFFICE O/P EST MOD 30 MIN: CPT

## 2021-01-25 NOTE — HISTORY OF PRESENT ILLNESS
[FreeTextEntry1] : The patient has daily watery BMs to the point of requiring diapers. He has no abd pain. 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 He is here to discuss surgical intervention.

## 2021-01-25 NOTE — DATA REVIEWED
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

## 2021-01-25 NOTE — ASSESSMENT
[FreeTextEntry1] : The patient is offered robotic/laparoscopic, possible open LAR with small bowel resection and intraop ureteral catheters. R/B/A d/w him including but not limited to pain, bleeding, infection, anastomotic leak, possible ostomy, ileus, wound complications, MI, stroke, DVT, PE and death. \par Prep instructions given and postop recovery discussed. All of his questions were answered. \par \par The following has been addressed with the patient during their preop visit. \par \par o	Antibiotics ordered\par o	Fasting times reviewed\par o	Bowel prep instructions given\par o	Oral carbohydrate given\par o	Goals for nutrition intake/ diet information given\par o	Pain management information given\par o	Discharge criteria & expected hospital stay\par \par

## 2021-01-25 NOTE — PHYSICAL EXAM
[Exam Deferred] : exam was deferred [Normal Breath Sounds] : Normal breath sounds [Normal Heart Sounds] : normal heart sounds [Normal Rate and Rhythm] : normal rate and rhythm [Alert] : alert [Oriented to Person] : oriented to person [Oriented to Place] : oriented to place [Oriented to Time] : oriented to time [Calm] : calm [de-identified] : soft, +BS, NT/ND [de-identified] : well appearing [de-identified] : NC/AT [de-identified] : +ROM/ROLANDO [de-identified] : intact

## 2021-02-08 ENCOUNTER — OUTPATIENT (OUTPATIENT)
Dept: OUTPATIENT SERVICES | Facility: HOSPITAL | Age: 66
LOS: 1 days | End: 2021-02-08
Payer: COMMERCIAL

## 2021-02-08 VITALS
WEIGHT: 205.03 LBS | DIASTOLIC BLOOD PRESSURE: 82 MMHG | SYSTOLIC BLOOD PRESSURE: 148 MMHG | HEART RATE: 63 BPM | OXYGEN SATURATION: 97 % | HEIGHT: 72 IN | RESPIRATION RATE: 14 BRPM | TEMPERATURE: 98 F

## 2021-02-08 DIAGNOSIS — Z90.89 ACQUIRED ABSENCE OF OTHER ORGANS: Chronic | ICD-10-CM

## 2021-02-08 DIAGNOSIS — Z87.19 PERSONAL HISTORY OF OTHER DISEASES OF THE DIGESTIVE SYSTEM: Chronic | ICD-10-CM

## 2021-02-08 DIAGNOSIS — K63.2 FISTULA OF INTESTINE: ICD-10-CM

## 2021-02-08 DIAGNOSIS — Z01.818 ENCOUNTER FOR OTHER PREPROCEDURAL EXAMINATION: ICD-10-CM

## 2021-02-08 DIAGNOSIS — Z29.9 ENCOUNTER FOR PROPHYLACTIC MEASURES, UNSPECIFIED: ICD-10-CM

## 2021-02-08 DIAGNOSIS — Z98.890 OTHER SPECIFIED POSTPROCEDURAL STATES: Chronic | ICD-10-CM

## 2021-02-08 LAB
A1C WITH ESTIMATED AVERAGE GLUCOSE RESULT: 5 % — SIGNIFICANT CHANGE UP (ref 4–5.6)
ANION GAP SERPL CALC-SCNC: 11 MMOL/L — SIGNIFICANT CHANGE UP (ref 5–17)
BLD GP AB SCN SERPL QL: NEGATIVE — SIGNIFICANT CHANGE UP
BUN SERPL-MCNC: 23 MG/DL — SIGNIFICANT CHANGE UP (ref 7–23)
CALCIUM SERPL-MCNC: 9.4 MG/DL — SIGNIFICANT CHANGE UP (ref 8.4–10.5)
CHLORIDE SERPL-SCNC: 102 MMOL/L — SIGNIFICANT CHANGE UP (ref 96–108)
CO2 SERPL-SCNC: 29 MMOL/L — SIGNIFICANT CHANGE UP (ref 22–31)
CREAT SERPL-MCNC: 0.9 MG/DL — SIGNIFICANT CHANGE UP (ref 0.5–1.3)
ESTIMATED AVERAGE GLUCOSE: 97 MG/DL — SIGNIFICANT CHANGE UP (ref 68–114)
GLUCOSE SERPL-MCNC: 89 MG/DL — SIGNIFICANT CHANGE UP (ref 70–99)
HCT VFR BLD CALC: 47.7 % — SIGNIFICANT CHANGE UP (ref 39–50)
HGB BLD-MCNC: 15.4 G/DL — SIGNIFICANT CHANGE UP (ref 13–17)
MCHC RBC-ENTMCNC: 29 PG — SIGNIFICANT CHANGE UP (ref 27–34)
MCHC RBC-ENTMCNC: 32.3 GM/DL — SIGNIFICANT CHANGE UP (ref 32–36)
MCV RBC AUTO: 89.8 FL — SIGNIFICANT CHANGE UP (ref 80–100)
NRBC # BLD: 0 /100 WBCS — SIGNIFICANT CHANGE UP (ref 0–0)
PLATELET # BLD AUTO: 280 K/UL — SIGNIFICANT CHANGE UP (ref 150–400)
POTASSIUM SERPL-MCNC: 4.1 MMOL/L — SIGNIFICANT CHANGE UP (ref 3.5–5.3)
POTASSIUM SERPL-SCNC: 4.1 MMOL/L — SIGNIFICANT CHANGE UP (ref 3.5–5.3)
RBC # BLD: 5.31 M/UL — SIGNIFICANT CHANGE UP (ref 4.2–5.8)
RBC # FLD: 14.4 % — SIGNIFICANT CHANGE UP (ref 10.3–14.5)
RH IG SCN BLD-IMP: POSITIVE — SIGNIFICANT CHANGE UP
SODIUM SERPL-SCNC: 142 MMOL/L — SIGNIFICANT CHANGE UP (ref 135–145)
WBC # BLD: 8.14 K/UL — SIGNIFICANT CHANGE UP (ref 3.8–10.5)
WBC # FLD AUTO: 8.14 K/UL — SIGNIFICANT CHANGE UP (ref 3.8–10.5)

## 2021-02-08 PROCEDURE — 86901 BLOOD TYPING SEROLOGIC RH(D): CPT

## 2021-02-08 PROCEDURE — 85027 COMPLETE CBC AUTOMATED: CPT

## 2021-02-08 PROCEDURE — 80048 BASIC METABOLIC PNL TOTAL CA: CPT

## 2021-02-08 PROCEDURE — 83036 HEMOGLOBIN GLYCOSYLATED A1C: CPT

## 2021-02-08 PROCEDURE — 86850 RBC ANTIBODY SCREEN: CPT

## 2021-02-08 PROCEDURE — 86900 BLOOD TYPING SEROLOGIC ABO: CPT

## 2021-02-08 PROCEDURE — G0463: CPT

## 2021-02-08 RX ORDER — CEFOTETAN DISODIUM 1 G
2 VIAL (EA) INJECTION ONCE
Refills: 0 | Status: COMPLETED | OUTPATIENT
Start: 2021-02-22 | End: 2021-02-22

## 2021-02-08 NOTE — H&P PST ADULT - NSICDXPASTMEDICALHX_GEN_ALL_CORE_FT
PAST MEDICAL HISTORY:  Fistula of intestine      PAST MEDICAL HISTORY:  Fistula of intestine     History of diverticulitis

## 2021-02-08 NOTE — H&P PST ADULT - NSICDXPROBLEM_GEN_ALL_CORE_FT
PROBLEM DIAGNOSES  Problem: Fistula of intestine  Assessment and Plan: Scheduled ERAS, robotic laparoscopic anterior resection, small bowel resection on 2/22/2021  Pre-op instructions given to pt, chlorhexidine soap provided  Lab work sent at PST  Covid swab scheduled 2/19    Problem: Need for prophylactic measure  Assessment and Plan: The Caprini score indicates that this patient is at high risk for a VTE event (score 6 or greater). Surgical patients in this group will benefit from both pharmacologic prophylaxis and intermittent compression devices.  The surgical team will determine the balance between VTE risk and bleeding risk, and other clinical considerations

## 2021-02-08 NOTE — H&P PST ADULT - ASSESSMENT
CAPRINI SCORE [CLOT updated 18]    AGE RELATED RISK FACTORS                                                       MOBILITY RELATED FACTORS  [ ] Age 41-60 years                                            (1 Point)                    [ ] Bed rest                                                        (1 Point)  [x] Age: 61-74 years                                           (2 Points)                  [ ] Plaster cast                                                   (2 Points)  [ ] Age= 75 years                                              (3 Points)                    [ ] Bed bound for more than 72 hours                 (2 Points)    DISEASE RELATED RISK FACTORS                                               GENDER SPECIFIC FACTORS  [ ] Edema in the lower extremities                       (1 Point)              [ ] Pregnancy                                                     (1 Point)  [ ] Varicose veins                                               (1 Point)                     [ ] Post-partum < 6 weeks                                   (1 Point)             [x] BMI > 25 Kg/m2                                            (1 Point)                     [ ] Hormonal therapy  or oral contraception          (1 Point)                 [ ] Sepsis (in the previous month)                        (1 Point)               [ ] History of pregnancy complications                 (1 point)  [ ] Pneumonia or serious lung disease                                               [ ] Unexplained or recurrent                     (1 Point)           (in the previous month)                               (1 Point)  [ ] Abnormal pulmonary function test                     (1 Point)                 SURGERY RELATED RISK FACTORS  [ ] Acute myocardial infarction                              (1 Point)               [ ]  Section                                             (1 Point)  [ ] Congestive heart failure (in the previous month)  (1 Point)      [ ] Minor surgery                                                  (1 Point)   [x] Inflammatory bowel disease                             (1 Point)               [ ] Arthroscopic surgery                                        (2 Points)  [ ] Central venous access                                      (2 Points)                [x] General surgery lasting more than 45 minutes (2 points)  [ ] Present or previous malignancy                     (2 Points)                [ ] Elective arthroplasty                                         (5 points)    [ ] Stroke (in the previous month)                          (5 Points)                                                                                                                                                           HEMATOLOGY RELATED FACTORS                                                 TRAUMA RELATED RISK FACTORS  [ ] Prior episodes of VTE                                     (3 Points)                [ ] Fracture of the hip, pelvis, or leg                       (5 Points)  [ ] Positive family history for VTE                         (3 Points)             [ ] Acute spinal cord injury (in the previous month)  (5 Points)  [ ] Prothrombin 38486 A                                     (3 Points)               [ ] Paralysis  (less than 1 month)                             (5 Points)  [ ] Factor V Leiden                                             (3 Points)                  [ ] Multiple Trauma within 1 month                        (5 Points)  [ ] Lupus anticoagulants                                     (3 Points)                                                           [ ] Anticardiolipin antibodies                               (3 Points)                                                       [ ] High homocysteine in the blood                      (3 Points)                                             [ ] Other congenital or acquired thrombophilia      (3 Points)                                                [ ] Heparin induced thrombocytopenia                  (3 Points)                                     Total Score [   6    ]

## 2021-02-08 NOTE — H&P PST ADULT - HISTORY OF PRESENT ILLNESS
66 y/o male, with no significant medical history, c/o abdominal pain, fever and was referred to Dr. Francisco. Patient was given multiple antibiotics and complained of watery BMs. Patient reports getting Colonoscopy done 8/2020 revealed diverticulitis. 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 Chun. Presents to [facility] for a scheduled ERAS, Robotic laparoscopic anterior resection, small bowel resection on 2/22/2021.    *Covid PCR scheduled 2/19 at [facility] 64 y/o male, with no significant medical history, c/o abdominal pain, fever and was referred to Dr. Francisco. Patient was given multiple antibiotics and complained of watery BMs. Patient reports +Cdiff. Colonoscopy done 8/2020 revealed diverticulitis. 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 Chun. Presents to PST for a scheduled ERAS, robotic laparoscopic anterior resection, small bowel resection on 2/22/2021.    *Covid PCR scheduled 2/19 at [facility] 66 y/o male, with no significant medical history, c/o abdominal pain, fever and was referred to Dr. Francisco. Patient was given multiple antibiotics and complained of watery BMs & fecal incontinence requiring diapers. Patient reports diagnosed  +Cdiff. Colonoscopy done 8/2020 revealed diverticulitis. 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 Chun. Presents to PST for a scheduled ERAS, robotic laparoscopic anterior resection, small bowel resection on 2/22/2021.    *Covid PCR scheduled 2/19 at [facility]

## 2021-02-08 NOTE — H&P PST ADULT - NSANTHOSAYNRD_GEN_A_CORE
No. LAVON screening performed.  STOP BANG Legend: 0-2 = LOW Risk; 3-4 = INTERMEDIATE Risk; 5-8 = HIGH Risk

## 2021-02-08 NOTE — H&P PST ADULT - NSICDXPASTSURGICALHX_GEN_ALL_CORE_FT
PAST SURGICAL HISTORY:  H/O colonoscopy 8/2020    History of diverticulitis     History of tonsillectomy childhood    S/P right inguinal hernia repair 2012     PAST SURGICAL HISTORY:  H/O colonoscopy 8/2020    History of tonsillectomy childhood    S/P right inguinal hernia repair 2012

## 2021-02-16 PROBLEM — K63.2 FISTULA OF INTESTINE: Chronic | Status: ACTIVE | Noted: 2021-02-08

## 2021-02-16 PROBLEM — Z87.19 PERSONAL HISTORY OF OTHER DISEASES OF THE DIGESTIVE SYSTEM: Chronic | Status: ACTIVE | Noted: 2021-02-08

## 2021-02-19 ENCOUNTER — OUTPATIENT (OUTPATIENT)
Dept: OUTPATIENT SERVICES | Facility: HOSPITAL | Age: 66
LOS: 1 days | End: 2021-02-19
Payer: COMMERCIAL

## 2021-02-19 DIAGNOSIS — Z90.89 ACQUIRED ABSENCE OF OTHER ORGANS: Chronic | ICD-10-CM

## 2021-02-19 DIAGNOSIS — Z11.52 ENCOUNTER FOR SCREENING FOR COVID-19: ICD-10-CM

## 2021-02-19 DIAGNOSIS — Z98.890 OTHER SPECIFIED POSTPROCEDURAL STATES: Chronic | ICD-10-CM

## 2021-02-19 LAB — SARS-COV-2 RNA SPEC QL NAA+PROBE: SIGNIFICANT CHANGE UP

## 2021-02-19 PROCEDURE — U0005: CPT

## 2021-02-19 PROCEDURE — C9803: CPT

## 2021-02-19 PROCEDURE — U0003: CPT

## 2021-02-21 ENCOUNTER — TRANSCRIPTION ENCOUNTER (OUTPATIENT)
Age: 66
End: 2021-02-21

## 2021-02-22 ENCOUNTER — APPOINTMENT (OUTPATIENT)
Dept: COLORECTAL SURGERY | Facility: HOSPITAL | Age: 66
End: 2021-02-22
Payer: COMMERCIAL

## 2021-02-22 ENCOUNTER — INPATIENT (INPATIENT)
Facility: HOSPITAL | Age: 66
LOS: 5 days | Discharge: HOME | DRG: 329 | End: 2021-02-28
Attending: SURGERY | Admitting: SURGERY
Payer: COMMERCIAL

## 2021-02-22 ENCOUNTER — APPOINTMENT (OUTPATIENT)
Dept: UROLOGY | Facility: HOSPITAL | Age: 66
End: 2021-02-22

## 2021-02-22 ENCOUNTER — RESULT REVIEW (OUTPATIENT)
Age: 66
End: 2021-02-22

## 2021-02-22 VITALS
SYSTOLIC BLOOD PRESSURE: 161 MMHG | RESPIRATION RATE: 16 BRPM | WEIGHT: 190.92 LBS | HEIGHT: 72 IN | OXYGEN SATURATION: 97 % | DIASTOLIC BLOOD PRESSURE: 75 MMHG | HEART RATE: 94 BPM | TEMPERATURE: 98 F

## 2021-02-22 DIAGNOSIS — Z98.890 OTHER SPECIFIED POSTPROCEDURAL STATES: Chronic | ICD-10-CM

## 2021-02-22 DIAGNOSIS — Z90.89 ACQUIRED ABSENCE OF OTHER ORGANS: Chronic | ICD-10-CM

## 2021-02-22 DIAGNOSIS — K63.2 FISTULA OF INTESTINE: ICD-10-CM

## 2021-02-22 LAB
ANION GAP SERPL CALC-SCNC: 13 MMOL/L — SIGNIFICANT CHANGE UP (ref 5–17)
BUN SERPL-MCNC: 18 MG/DL — SIGNIFICANT CHANGE UP (ref 7–23)
CALCIUM SERPL-MCNC: 8.5 MG/DL — SIGNIFICANT CHANGE UP (ref 8.4–10.5)
CHLORIDE SERPL-SCNC: 103 MMOL/L — SIGNIFICANT CHANGE UP (ref 96–108)
CO2 SERPL-SCNC: 25 MMOL/L — SIGNIFICANT CHANGE UP (ref 22–31)
CREAT SERPL-MCNC: 0.8 MG/DL — SIGNIFICANT CHANGE UP (ref 0.5–1.3)
GLUCOSE BLDC GLUCOMTR-MCNC: 132 MG/DL — HIGH (ref 70–99)
GLUCOSE BLDC GLUCOMTR-MCNC: 154 MG/DL — HIGH (ref 70–99)
GLUCOSE SERPL-MCNC: 156 MG/DL — HIGH (ref 70–99)
HCT VFR BLD CALC: 46.3 % — SIGNIFICANT CHANGE UP (ref 39–50)
HGB BLD-MCNC: 14.8 G/DL — SIGNIFICANT CHANGE UP (ref 13–17)
MCHC RBC-ENTMCNC: 28.9 PG — SIGNIFICANT CHANGE UP (ref 27–34)
MCHC RBC-ENTMCNC: 32 GM/DL — SIGNIFICANT CHANGE UP (ref 32–36)
MCV RBC AUTO: 90.4 FL — SIGNIFICANT CHANGE UP (ref 80–100)
NRBC # BLD: 0 /100 WBCS — SIGNIFICANT CHANGE UP (ref 0–0)
PLATELET # BLD AUTO: 260 K/UL — SIGNIFICANT CHANGE UP (ref 150–400)
POTASSIUM SERPL-MCNC: 4.3 MMOL/L — SIGNIFICANT CHANGE UP (ref 3.5–5.3)
POTASSIUM SERPL-SCNC: 4.3 MMOL/L — SIGNIFICANT CHANGE UP (ref 3.5–5.3)
RBC # BLD: 5.12 M/UL — SIGNIFICANT CHANGE UP (ref 4.2–5.8)
RBC # FLD: 13.5 % — SIGNIFICANT CHANGE UP (ref 10.3–14.5)
SODIUM SERPL-SCNC: 141 MMOL/L — SIGNIFICANT CHANGE UP (ref 135–145)
WBC # BLD: 13.25 K/UL — HIGH (ref 3.8–10.5)
WBC # FLD AUTO: 13.25 K/UL — HIGH (ref 3.8–10.5)

## 2021-02-22 PROCEDURE — 52005 CYSTO W/URTRL CATHJ: CPT

## 2021-02-22 PROCEDURE — 44145 PARTIAL REMOVAL OF COLON: CPT

## 2021-02-22 PROCEDURE — 44120 REMOVAL OF SMALL INTESTINE: CPT

## 2021-02-22 PROCEDURE — 88312 SPECIAL STAINS GROUP 1: CPT | Mod: 26

## 2021-02-22 PROCEDURE — 45330 DIAGNOSTIC SIGMOIDOSCOPY: CPT | Mod: 59

## 2021-02-22 RX ORDER — OXYCODONE HYDROCHLORIDE 5 MG/1
5 TABLET ORAL ONCE
Refills: 0 | Status: DISCONTINUED | OUTPATIENT
Start: 2021-02-22 | End: 2021-02-22

## 2021-02-22 RX ORDER — ENOXAPARIN SODIUM 100 MG/ML
40 INJECTION SUBCUTANEOUS DAILY
Refills: 0 | Status: DISCONTINUED | OUTPATIENT
Start: 2021-02-22 | End: 2021-02-24

## 2021-02-22 RX ORDER — OXYCODONE HYDROCHLORIDE 5 MG/1
2.5 TABLET ORAL EVERY 4 HOURS
Refills: 0 | Status: DISCONTINUED | OUTPATIENT
Start: 2021-02-22 | End: 2021-02-22

## 2021-02-22 RX ORDER — KETOROLAC TROMETHAMINE 30 MG/ML
15 SYRINGE (ML) INJECTION ONCE
Refills: 0 | Status: DISCONTINUED | OUTPATIENT
Start: 2021-02-22 | End: 2021-02-22

## 2021-02-22 RX ORDER — ACETAMINOPHEN 500 MG
1000 TABLET ORAL ONCE
Refills: 0 | Status: DISCONTINUED | OUTPATIENT
Start: 2021-02-22 | End: 2021-02-22

## 2021-02-22 RX ORDER — NALOXONE HYDROCHLORIDE 4 MG/.1ML
0.1 SPRAY NASAL
Refills: 0 | Status: DISCONTINUED | OUTPATIENT
Start: 2021-02-22 | End: 2021-02-23

## 2021-02-22 RX ORDER — MORPHINE SULFATE 50 MG/1
0.2 CAPSULE, EXTENDED RELEASE ORAL ONCE
Refills: 0 | Status: DISCONTINUED | OUTPATIENT
Start: 2021-02-22 | End: 2021-02-22

## 2021-02-22 RX ORDER — SODIUM CHLORIDE 9 MG/ML
1000 INJECTION, SOLUTION INTRAVENOUS
Refills: 0 | Status: DISCONTINUED | OUTPATIENT
Start: 2021-02-22 | End: 2021-02-23

## 2021-02-22 RX ORDER — ONDANSETRON 8 MG/1
4 TABLET, FILM COATED ORAL EVERY 6 HOURS
Refills: 0 | Status: DISCONTINUED | OUTPATIENT
Start: 2021-02-22 | End: 2021-02-23

## 2021-02-22 RX ORDER — KETOROLAC TROMETHAMINE 30 MG/ML
15 SYRINGE (ML) INJECTION EVERY 6 HOURS
Refills: 0 | Status: DISCONTINUED | OUTPATIENT
Start: 2021-02-22 | End: 2021-02-23

## 2021-02-22 RX ORDER — CHLORHEXIDINE GLUCONATE 213 G/1000ML
1 SOLUTION TOPICAL ONCE
Refills: 0 | Status: COMPLETED | OUTPATIENT
Start: 2021-02-22 | End: 2021-02-22

## 2021-02-22 RX ORDER — ACETAMINOPHEN 500 MG
1000 TABLET ORAL EVERY 6 HOURS
Refills: 0 | Status: DISCONTINUED | OUTPATIENT
Start: 2021-02-22 | End: 2021-02-23

## 2021-02-22 RX ORDER — GABAPENTIN 400 MG/1
600 CAPSULE ORAL ONCE
Refills: 0 | Status: COMPLETED | OUTPATIENT
Start: 2021-02-22 | End: 2021-02-22

## 2021-02-22 RX ORDER — ACETAMINOPHEN 500 MG
1000 TABLET ORAL EVERY 6 HOURS
Refills: 0 | Status: DISCONTINUED | OUTPATIENT
Start: 2021-02-22 | End: 2021-02-22

## 2021-02-22 RX ORDER — LIDOCAINE HCL 20 MG/ML
0.2 VIAL (ML) INJECTION ONCE
Refills: 0 | Status: COMPLETED | OUTPATIENT
Start: 2021-02-22 | End: 2021-02-22

## 2021-02-22 RX ORDER — SODIUM CHLORIDE 9 MG/ML
3 INJECTION INTRAMUSCULAR; INTRAVENOUS; SUBCUTANEOUS EVERY 8 HOURS
Refills: 0 | Status: DISCONTINUED | OUTPATIENT
Start: 2021-02-22 | End: 2021-02-28

## 2021-02-22 RX ORDER — MORPHINE SULFATE 50 MG/1
2 CAPSULE, EXTENDED RELEASE ORAL EVERY 6 HOURS
Refills: 0 | Status: DISCONTINUED | OUTPATIENT
Start: 2021-02-22 | End: 2021-02-22

## 2021-02-22 RX ORDER — SODIUM CHLORIDE 9 MG/ML
3 INJECTION INTRAMUSCULAR; INTRAVENOUS; SUBCUTANEOUS EVERY 8 HOURS
Refills: 0 | Status: DISCONTINUED | OUTPATIENT
Start: 2021-02-22 | End: 2021-02-22

## 2021-02-22 RX ORDER — SODIUM CHLORIDE 9 MG/ML
1000 INJECTION, SOLUTION INTRAVENOUS
Refills: 0 | Status: DISCONTINUED | OUTPATIENT
Start: 2021-02-22 | End: 2021-02-22

## 2021-02-22 RX ORDER — OXYCODONE HYDROCHLORIDE 5 MG/1
5 TABLET ORAL EVERY 4 HOURS
Refills: 0 | Status: DISCONTINUED | OUTPATIENT
Start: 2021-02-22 | End: 2021-02-23

## 2021-02-22 RX ORDER — OXYCODONE HYDROCHLORIDE 5 MG/1
5 TABLET ORAL EVERY 4 HOURS
Refills: 0 | Status: DISCONTINUED | OUTPATIENT
Start: 2021-02-22 | End: 2021-02-22

## 2021-02-22 RX ORDER — CELECOXIB 200 MG/1
400 CAPSULE ORAL ONCE
Refills: 0 | Status: COMPLETED | OUTPATIENT
Start: 2021-02-22 | End: 2021-02-22

## 2021-02-22 RX ORDER — KETOROLAC TROMETHAMINE 30 MG/ML
15 SYRINGE (ML) INJECTION EVERY 6 HOURS
Refills: 0 | Status: DISCONTINUED | OUTPATIENT
Start: 2021-02-22 | End: 2021-02-22

## 2021-02-22 RX ADMIN — SODIUM CHLORIDE 3 MILLILITER(S): 9 INJECTION INTRAMUSCULAR; INTRAVENOUS; SUBCUTANEOUS at 05:57

## 2021-02-22 RX ADMIN — Medication 400 MILLIGRAM(S): at 17:51

## 2021-02-22 RX ADMIN — CELECOXIB 400 MILLIGRAM(S): 200 CAPSULE ORAL at 05:47

## 2021-02-22 RX ADMIN — ENOXAPARIN SODIUM 40 MILLIGRAM(S): 100 INJECTION SUBCUTANEOUS at 17:52

## 2021-02-22 RX ADMIN — Medication 15 MILLIGRAM(S): at 17:51

## 2021-02-22 RX ADMIN — GABAPENTIN 600 MILLIGRAM(S): 400 CAPSULE ORAL at 05:44

## 2021-02-22 RX ADMIN — SODIUM CHLORIDE 3 MILLILITER(S): 9 INJECTION INTRAMUSCULAR; INTRAVENOUS; SUBCUTANEOUS at 21:05

## 2021-02-22 RX ADMIN — Medication 0.2 MILLILITER(S): at 05:57

## 2021-02-22 RX ADMIN — ONDANSETRON 4 MILLIGRAM(S): 8 TABLET, FILM COATED ORAL at 16:30

## 2021-02-22 RX ADMIN — CHLORHEXIDINE GLUCONATE 1 APPLICATION(S): 213 SOLUTION TOPICAL at 05:56

## 2021-02-22 NOTE — BRIEF OPERATIVE NOTE - NSICDXBRIEFPREOP_GEN_ALL_CORE_FT
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

## 2021-02-22 NOTE — BRIEF OPERATIVE NOTE - NSICDXBRIEFPOSTOP_GEN_ALL_CORE_FT
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

## 2021-02-22 NOTE — BRIEF OPERATIVE NOTE - NSICDXBRIEFPROCEDURE_GEN_ALL_CORE_FT
PROCEDURES:  Flexible sigmoidoscopy 22-Feb-2021 13:10:09  Greg Kwok  Small bowel resection 22-Feb-2021 13:10:00  Greg Kwok  Robot-assisted laparoscopic low anterior resection of rectum with laparotomy if indicated 22-Feb-2021 13:09:50  Greg Kwok

## 2021-02-22 NOTE — CHART NOTE - NSCHARTNOTEFT_GEN_A_CORE
POST-OPERATIVE NOTE    Subjective:  Patient is s/p robotic converted to open LAR, SBR for coloenteric fistula, flexible sigmoidoscopy. Patient feeling well, complaining only of abdominal pain when he coughs. Had one episode of emesis after drinking an Ensure, still feeling slightly nauseous. Denies dizziness, lightheadedness, chest pain, difficulty breathing. Has not been OOB.      Vital Signs Last 24 Hrs  T(C): 36.3 (22 Feb 2021 17:26), Max: 36.8 (22 Feb 2021 05:28)  T(F): 97.4 (22 Feb 2021 17:26), Max: 98.2 (22 Feb 2021 05:28)  HR: 80 (22 Feb 2021 17:26) (70 - 94)  BP: 119/75 (22 Feb 2021 17:26) (105/64 - 161/75)  BP(mean): 89 (22 Feb 2021 17:00) (80 - 96)  RR: 18 (22 Feb 2021 17:26) (14 - 18)  SpO2: 97% (22 Feb 2021 17:26) (95% - 98%)  I&O's Detail    22 Feb 2021 07:01  -  22 Feb 2021 18:36  --------------------------------------------------------  IN:    Lactated Ringers: 240 mL  Total IN: 240 mL    OUT:    Indwelling Catheter - Urethral (mL): 230 mL  Total OUT: 230 mL    Total NET: 10 mL        enoxaparin Injectable 40    PAST MEDICAL & SURGICAL HISTORY:  History of diverticulitis    Fistula of intestine    H/O colonoscopy  8/2020    History of tonsillectomy  childhood    S/P right inguinal hernia repair  2012          Physical Exam:  General: NAD, resting comfortably in bed  Pulmonary: breathing comfortably on 2L nasal cannula  Cardiovascular: NSR  Abdominal: softly distended, nontender; port site incisions with steri strips c/d/i, lower midline incision covered with aquacel, w serosanguinous strikethrough  Genitourinary: Stringer in place draining blood tinged urine    LABS:                        14.8   13.25 )-----------( 260      ( 22 Feb 2021 13:37 )             46.3     02-22    141  |  103  |  18  ----------------------------<  156<H>  4.3   |  25  |  0.80    Ca    8.5      22 Feb 2021 13:37        CAPILLARY BLOOD GLUCOSE      POCT Blood Glucose.: 132 mg/dL (22 Feb 2021 13:09)  POCT Blood Glucose.: 154 mg/dL (22 Feb 2021 05:52)      Radiology and Additional Studies:    Assessment:  The patient is a 66y Male who is now several hours post-op from a robotic converted to open LAR, SBR for coloenteric fistula, flexible sigmoidoscopy for sigmoid-small bowel fistula.    Plan:  - Diet: CLD, keep on clears tomorrow  - pain control IV Tylenol, Toradol, PRN oxy, PRN morphine  - pt received Duramorph preoperatively; monitor continuous pulse ox for 24hrs  - maintain Stringer until POD5   - remove R Ucath tomorrow  - DVT ppx, LVX  - OOB and ambulating as tolerated  - F/u AM labs    Red Surgery   [id number] POST-OPERATIVE NOTE    Subjective:  Patient is s/p robotic converted to open LAR, SBR for coloenteric fistula, flexible sigmoidoscopy. Patient feeling well, complaining only of abdominal pain when he coughs. Had one episode of emesis after drinking an Ensure, still feeling slightly nauseous. Denies dizziness, lightheadedness, chest pain, difficulty breathing. Has not been OOB.      Vital Signs Last 24 Hrs  T(C): 36.3 (22 Feb 2021 17:26), Max: 36.8 (22 Feb 2021 05:28)  T(F): 97.4 (22 Feb 2021 17:26), Max: 98.2 (22 Feb 2021 05:28)  HR: 80 (22 Feb 2021 17:26) (70 - 94)  BP: 119/75 (22 Feb 2021 17:26) (105/64 - 161/75)  BP(mean): 89 (22 Feb 2021 17:00) (80 - 96)  RR: 18 (22 Feb 2021 17:26) (14 - 18)  SpO2: 97% (22 Feb 2021 17:26) (95% - 98%)  I&O's Detail    22 Feb 2021 07:01  -  22 Feb 2021 18:36  --------------------------------------------------------  IN:    Lactated Ringers: 240 mL  Total IN: 240 mL    OUT:    Indwelling Catheter - Urethral (mL): 230 mL  Total OUT: 230 mL    Total NET: 10 mL        enoxaparin Injectable 40    PAST MEDICAL & SURGICAL HISTORY:  History of diverticulitis    Fistula of intestine    H/O colonoscopy  8/2020    History of tonsillectomy  childhood    S/P right inguinal hernia repair  2012          Physical Exam:  General: NAD, resting comfortably in bed  Pulmonary: breathing comfortably on 2L nasal cannula  Cardiovascular: NSR  Abdominal: softly distended, nontender; port site incisions with steri strips c/d/i, lower midline incision covered with aquacel, w serosanguinous strikethrough  Genitourinary: Stringer in place draining blood tinged urine    LABS:                        14.8   13.25 )-----------( 260      ( 22 Feb 2021 13:37 )             46.3     02-22    141  |  103  |  18  ----------------------------<  156<H>  4.3   |  25  |  0.80    Ca    8.5      22 Feb 2021 13:37        CAPILLARY BLOOD GLUCOSE      POCT Blood Glucose.: 132 mg/dL (22 Feb 2021 13:09)  POCT Blood Glucose.: 154 mg/dL (22 Feb 2021 05:52)      Radiology and Additional Studies:    Assessment:  The patient is a 66y Male who is now several hours post-op from a robotic converted to open LAR, SBR for coloenteric fistula, flexible sigmoidoscopy for sigmoid-small bowel fistula.    Plan:  - Diet: CLD, keep on clears tomorrow  - pain control IV Tylenol, Toradol, PRN oxy  - pt received Duramorph preoperatively; monitor continuous pulse ox for 24hrs  - maintain Stringer until POD5   - remove R Ucath tomorrow  - DVT ppx, LVX  - OOB and ambulating as tolerated  - F/u AM labs    Red Surgery   [id number] POST-OPERATIVE NOTE    Subjective:  Patient is s/p robotic converted to open LAR, SBR for coloenteric fistula, flexible sigmoidoscopy. Patient feeling well, complaining only of abdominal pain when he coughs. Had one episode of emesis after drinking an Ensure, still feeling slightly nauseous. Denies dizziness, lightheadedness, chest pain, difficulty breathing. Has not been OOB.      Vital Signs Last 24 Hrs  T(C): 36.3 (22 Feb 2021 17:26), Max: 36.8 (22 Feb 2021 05:28)  T(F): 97.4 (22 Feb 2021 17:26), Max: 98.2 (22 Feb 2021 05:28)  HR: 80 (22 Feb 2021 17:26) (70 - 94)  BP: 119/75 (22 Feb 2021 17:26) (105/64 - 161/75)  BP(mean): 89 (22 Feb 2021 17:00) (80 - 96)  RR: 18 (22 Feb 2021 17:26) (14 - 18)  SpO2: 97% (22 Feb 2021 17:26) (95% - 98%)  I&O's Detail    22 Feb 2021 07:01  -  22 Feb 2021 18:36  --------------------------------------------------------  IN:    Lactated Ringers: 240 mL  Total IN: 240 mL    OUT:    Indwelling Catheter - Urethral (mL): 230 mL  Total OUT: 230 mL    Total NET: 10 mL        enoxaparin Injectable 40    PAST MEDICAL & SURGICAL HISTORY:  History of diverticulitis    Fistula of intestine    H/O colonoscopy  8/2020    History of tonsillectomy  childhood    S/P right inguinal hernia repair  2012          Physical Exam:  General: NAD, resting comfortably in bed  Pulmonary: breathing comfortably on 2L nasal cannula  Cardiovascular: NSR  Abdominal: softly distended, nontender; port site incisions with steri strips c/d/i, lower midline incision covered with aquacel, w serosanguinous strikethrough  Genitourinary: Stringer in place draining blood tinged urine    LABS:                        14.8   13.25 )-----------( 260      ( 22 Feb 2021 13:37 )             46.3     02-22    141  |  103  |  18  ----------------------------<  156<H>  4.3   |  25  |  0.80    Ca    8.5      22 Feb 2021 13:37        CAPILLARY BLOOD GLUCOSE      POCT Blood Glucose.: 132 mg/dL (22 Feb 2021 13:09)  POCT Blood Glucose.: 154 mg/dL (22 Feb 2021 05:52)      Radiology and Additional Studies:    Assessment:  The patient is a 66y Male who is now several hours post-op from a robotic converted to open LAR, SBR for coloenteric fistula, flexible sigmoidoscopy for sigmoid-small bowel fistula.    Plan:  - Diet: CLD, keep on clears tomorrow  - pain control IV Tylenol, Toradol, PRN oxy  - pt received Duramorph preoperatively; monitor continuous pulse ox for 24hrs  - maintain Stringer until POD5   - remove L Ucath tomorrow  - DVT ppx, LVX  - OOB and ambulating as tolerated  - F/u AM labs    Red Surgery   [id number]

## 2021-02-22 NOTE — BRIEF OPERATIVE NOTE - OPERATION/FINDINGS
Chronically inflamed sigmoid colon adhering to both bladder and terminal ileum. Bladder adhesion taken down without cystostomy noted. Small bowel adhesion taken down revealing fistula. Converted to open due to difficulty discerning the location of ureter (U-cath placed pre-op with ICG but didn't light up with Firefly filter). Diseased segment resected en block with primary anastomosis created with EEA. Flexible sigmoidoscopy showed intact anastomosis and negative leak test. Terminal ileum where it was thickened with colo-enteric fistula was resected with side-to-side anastomosis

## 2021-02-23 ENCOUNTER — TRANSCRIPTION ENCOUNTER (OUTPATIENT)
Age: 66
End: 2021-02-23

## 2021-02-23 LAB
ANION GAP SERPL CALC-SCNC: 11 MMOL/L — SIGNIFICANT CHANGE UP (ref 5–17)
ANION GAP SERPL CALC-SCNC: 9 MMOL/L — SIGNIFICANT CHANGE UP (ref 5–17)
BUN SERPL-MCNC: 24 MG/DL — HIGH (ref 7–23)
BUN SERPL-MCNC: 32 MG/DL — HIGH (ref 7–23)
CALCIUM SERPL-MCNC: 8.6 MG/DL — SIGNIFICANT CHANGE UP (ref 8.4–10.5)
CALCIUM SERPL-MCNC: 8.7 MG/DL — SIGNIFICANT CHANGE UP (ref 8.4–10.5)
CHLORIDE SERPL-SCNC: 100 MMOL/L — SIGNIFICANT CHANGE UP (ref 96–108)
CHLORIDE SERPL-SCNC: 104 MMOL/L — SIGNIFICANT CHANGE UP (ref 96–108)
CO2 SERPL-SCNC: 26 MMOL/L — SIGNIFICANT CHANGE UP (ref 22–31)
CO2 SERPL-SCNC: 28 MMOL/L — SIGNIFICANT CHANGE UP (ref 22–31)
CREAT ?TM UR-MCNC: 7 MG/DL — SIGNIFICANT CHANGE UP
CREAT SERPL-MCNC: 1.06 MG/DL — SIGNIFICANT CHANGE UP (ref 0.5–1.3)
CREAT SERPL-MCNC: 2.4 MG/DL — HIGH (ref 0.5–1.3)
GLUCOSE SERPL-MCNC: 103 MG/DL — HIGH (ref 70–99)
GLUCOSE SERPL-MCNC: 121 MG/DL — HIGH (ref 70–99)
HCT VFR BLD CALC: 41.8 % — SIGNIFICANT CHANGE UP (ref 39–50)
HGB BLD-MCNC: 13.8 G/DL — SIGNIFICANT CHANGE UP (ref 13–17)
MAGNESIUM SERPL-MCNC: 2.1 MG/DL — SIGNIFICANT CHANGE UP (ref 1.6–2.6)
MCHC RBC-ENTMCNC: 29.7 PG — SIGNIFICANT CHANGE UP (ref 27–34)
MCHC RBC-ENTMCNC: 33 GM/DL — SIGNIFICANT CHANGE UP (ref 32–36)
MCV RBC AUTO: 89.9 FL — SIGNIFICANT CHANGE UP (ref 80–100)
NRBC # BLD: 0 /100 WBCS — SIGNIFICANT CHANGE UP (ref 0–0)
OSMOLALITY UR: 35 MOS/KG — LOW (ref 300–900)
PHOSPHATE SERPL-MCNC: 4 MG/DL — SIGNIFICANT CHANGE UP (ref 2.5–4.5)
PLATELET # BLD AUTO: 261 K/UL — SIGNIFICANT CHANGE UP (ref 150–400)
POTASSIUM SERPL-MCNC: 4.2 MMOL/L — SIGNIFICANT CHANGE UP (ref 3.5–5.3)
POTASSIUM SERPL-MCNC: 4.8 MMOL/L — SIGNIFICANT CHANGE UP (ref 3.5–5.3)
POTASSIUM SERPL-SCNC: 4.2 MMOL/L — SIGNIFICANT CHANGE UP (ref 3.5–5.3)
POTASSIUM SERPL-SCNC: 4.8 MMOL/L — SIGNIFICANT CHANGE UP (ref 3.5–5.3)
RBC # BLD: 4.65 M/UL — SIGNIFICANT CHANGE UP (ref 4.2–5.8)
RBC # FLD: 13.9 % — SIGNIFICANT CHANGE UP (ref 10.3–14.5)
SODIUM SERPL-SCNC: 137 MMOL/L — SIGNIFICANT CHANGE UP (ref 135–145)
SODIUM SERPL-SCNC: 141 MMOL/L — SIGNIFICANT CHANGE UP (ref 135–145)
SODIUM UR-SCNC: <35 MMOL/L — SIGNIFICANT CHANGE UP
WBC # BLD: 10.7 K/UL — HIGH (ref 3.8–10.5)
WBC # FLD AUTO: 10.7 K/UL — HIGH (ref 3.8–10.5)

## 2021-02-23 PROCEDURE — 71045 X-RAY EXAM CHEST 1 VIEW: CPT | Mod: 26

## 2021-02-23 PROCEDURE — 71045 X-RAY EXAM CHEST 1 VIEW: CPT | Mod: 26,77

## 2021-02-23 RX ORDER — ACETAMINOPHEN 500 MG
2 TABLET ORAL
Qty: 0 | Refills: 0 | DISCHARGE
Start: 2021-02-23

## 2021-02-23 RX ORDER — SODIUM CHLORIDE 9 MG/ML
500 INJECTION, SOLUTION INTRAVENOUS ONCE
Refills: 0 | Status: COMPLETED | OUTPATIENT
Start: 2021-02-23 | End: 2021-02-23

## 2021-02-23 RX ORDER — ACETAMINOPHEN 500 MG
1000 TABLET ORAL EVERY 6 HOURS
Refills: 0 | Status: COMPLETED | OUTPATIENT
Start: 2021-02-23 | End: 2021-02-24

## 2021-02-23 RX ORDER — MAGNESIUM OXIDE 400 MG ORAL TABLET 241.3 MG
1000 TABLET ORAL
Refills: 0 | Status: DISCONTINUED | OUTPATIENT
Start: 2021-02-23 | End: 2021-02-23

## 2021-02-23 RX ORDER — BENZOCAINE AND MENTHOL 5; 1 G/100ML; G/100ML
1 LIQUID ORAL THREE TIMES A DAY
Refills: 0 | Status: DISCONTINUED | OUTPATIENT
Start: 2021-02-23 | End: 2021-02-27

## 2021-02-23 RX ORDER — SODIUM CHLORIDE 9 MG/ML
1000 INJECTION, SOLUTION INTRAVENOUS
Refills: 0 | Status: DISCONTINUED | OUTPATIENT
Start: 2021-02-23 | End: 2021-02-23

## 2021-02-23 RX ORDER — DEXTROSE MONOHYDRATE, SODIUM CHLORIDE, AND POTASSIUM CHLORIDE 50; .745; 4.5 G/1000ML; G/1000ML; G/1000ML
1000 INJECTION, SOLUTION INTRAVENOUS
Refills: 0 | Status: DISCONTINUED | OUTPATIENT
Start: 2021-02-23 | End: 2021-02-23

## 2021-02-23 RX ORDER — ACETAMINOPHEN 500 MG
1000 TABLET ORAL EVERY 6 HOURS
Refills: 0 | Status: DISCONTINUED | OUTPATIENT
Start: 2021-02-23 | End: 2021-02-23

## 2021-02-23 RX ORDER — PANTOPRAZOLE SODIUM 20 MG/1
40 TABLET, DELAYED RELEASE ORAL DAILY
Refills: 0 | Status: DISCONTINUED | OUTPATIENT
Start: 2021-02-23 | End: 2021-02-25

## 2021-02-23 RX ORDER — SODIUM CHLORIDE 9 MG/ML
1000 INJECTION, SOLUTION INTRAVENOUS
Refills: 0 | Status: DISCONTINUED | OUTPATIENT
Start: 2021-02-23 | End: 2021-02-24

## 2021-02-23 RX ORDER — IBUPROFEN 200 MG
1 TABLET ORAL
Qty: 0 | Refills: 0 | DISCHARGE
Start: 2021-02-23

## 2021-02-23 RX ORDER — IBUPROFEN 200 MG
400 TABLET ORAL EVERY 4 HOURS
Refills: 0 | Status: DISCONTINUED | OUTPATIENT
Start: 2021-02-23 | End: 2021-02-23

## 2021-02-23 RX ORDER — ONDANSETRON 8 MG/1
4 TABLET, FILM COATED ORAL ONCE
Refills: 0 | Status: COMPLETED | OUTPATIENT
Start: 2021-02-23 | End: 2021-02-23

## 2021-02-23 RX ORDER — SODIUM CHLORIDE 9 MG/ML
1000 INJECTION, SOLUTION INTRAVENOUS ONCE
Refills: 0 | Status: COMPLETED | OUTPATIENT
Start: 2021-02-23 | End: 2021-02-23

## 2021-02-23 RX ADMIN — Medication 15 MILLIGRAM(S): at 03:33

## 2021-02-23 RX ADMIN — SODIUM CHLORIDE 3 MILLILITER(S): 9 INJECTION INTRAMUSCULAR; INTRAVENOUS; SUBCUTANEOUS at 17:21

## 2021-02-23 RX ADMIN — SODIUM CHLORIDE 2000 MILLILITER(S): 9 INJECTION, SOLUTION INTRAVENOUS at 14:25

## 2021-02-23 RX ADMIN — SODIUM CHLORIDE 125 MILLILITER(S): 9 INJECTION, SOLUTION INTRAVENOUS at 22:24

## 2021-02-23 RX ADMIN — Medication 15 MILLIGRAM(S): at 10:11

## 2021-02-23 RX ADMIN — SODIUM CHLORIDE 3 MILLILITER(S): 9 INJECTION INTRAMUSCULAR; INTRAVENOUS; SUBCUTANEOUS at 06:09

## 2021-02-23 RX ADMIN — MAGNESIUM OXIDE 400 MG ORAL TABLET 1000 MILLIGRAM(S): 241.3 TABLET ORAL at 17:35

## 2021-02-23 RX ADMIN — MAGNESIUM OXIDE 400 MG ORAL TABLET 1000 MILLIGRAM(S): 241.3 TABLET ORAL at 10:11

## 2021-02-23 RX ADMIN — OXYCODONE HYDROCHLORIDE 5 MILLIGRAM(S): 5 TABLET ORAL at 17:35

## 2021-02-23 RX ADMIN — SODIUM CHLORIDE 1000 MILLILITER(S): 9 INJECTION, SOLUTION INTRAVENOUS at 17:41

## 2021-02-23 RX ADMIN — DEXTROSE MONOHYDRATE, SODIUM CHLORIDE, AND POTASSIUM CHLORIDE 75 MILLILITER(S): 50; .745; 4.5 INJECTION, SOLUTION INTRAVENOUS at 17:38

## 2021-02-23 RX ADMIN — SODIUM CHLORIDE 3 MILLILITER(S): 9 INJECTION INTRAMUSCULAR; INTRAVENOUS; SUBCUTANEOUS at 22:15

## 2021-02-23 RX ADMIN — ONDANSETRON 4 MILLIGRAM(S): 8 TABLET, FILM COATED ORAL at 19:00

## 2021-02-23 RX ADMIN — Medication 400 MILLIGRAM(S): at 00:09

## 2021-02-23 RX ADMIN — Medication 400 MILLIGRAM(S): at 12:52

## 2021-02-23 RX ADMIN — DEXTROSE MONOHYDRATE, SODIUM CHLORIDE, AND POTASSIUM CHLORIDE 50 MILLILITER(S): 50; .745; 4.5 INJECTION, SOLUTION INTRAVENOUS at 06:12

## 2021-02-23 RX ADMIN — Medication 1000 MILLIGRAM(S): at 17:37

## 2021-02-23 RX ADMIN — ENOXAPARIN SODIUM 40 MILLIGRAM(S): 100 INJECTION SUBCUTANEOUS at 12:37

## 2021-02-23 NOTE — PHYSICAL THERAPY INITIAL EVALUATION ADULT - PERTINENT HX OF CURRENT PROBLEM, REHAB EVAL
no significant medical history, c/o abdominal pain, fever and was referred to Dr. Francisco. 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 Chun. Presents to [facility] for a scheduled ERAS, robotic laparoscopic anterior resection, small bowel resection on 2/22/2021.

## 2021-02-23 NOTE — DIETITIAN INITIAL EVALUATION ADULT. - CHIEF COMPLAINT
This is a "66y Male who is now s/p robotic converted to open LAR, SBR for coloenteric fistula, flexible sigmoidoscopy for sigmoid-small bowel fistula 2/22"

## 2021-02-23 NOTE — DISCHARGE NOTE PROVIDER - NSDCACTIVITY_GEN_ALL_CORE
Do not drive while taking pain medications./Do not drive or operate machinery/Showering allowed/Walking - Indoors allowed/No heavy lifting/straining/Walking - Outdoors allowed

## 2021-02-23 NOTE — DIETITIAN INITIAL EVALUATION ADULT. - ORAL INTAKE PTA/DIET HISTORY
Pt reports good PO intake and appetite. Was following a Regular diet consisting of a variety of foods. Reports allergy to "wheat bread" (reaction: mouth blisters), however able to tolerate others foods with wheat in it. Pt denies chewing/swallowing difficulty, nausea, vomiting, does endorses diarrhea 2/2 diverticulitis and sigmoid fistula. Takes multivitamin supplement once daily.

## 2021-02-23 NOTE — DIETITIAN INITIAL EVALUATION ADULT. - ADD RECOMMEND
1) Medical team to advance diet when medically feasible. Consider advancing to  low fiber diet as tolerated. 2) Recommend addition of Ensure Surgery BID to supplement PO intake. 3) Diet education provided, reinforce as needed.

## 2021-02-23 NOTE — DISCHARGE NOTE PROVIDER - HOSPITAL COURSE
64 y/o male, with no significant medical history, c/o abdominal pain, fever and was referred to Dr. Francisco. Patient was given multiple antibiotics and complained of watery BMs & fecal incontinence requiring diapers. Patient reports diagnosed  +Cdiff. Colonoscopy done 8/2020 revealed diverticulitis. 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 Chun. Presents to [facility] for a scheduled ERAS, robotic laparoscopic anterior resection, small bowel resection on 2/22/2021.    Patient went to the OR on 2/22 for a robotic converted to open LAR, SBR for coloenteric fistula, flexible sigmoidoscopy for sigmoid-small bowel fistula. Secondary to the coloenteric fistula the ch to remain in place for 5 days. The patient tolerated the procedure well (see operative report for full details). He received intrathecal morphine for pain control. He was placed on IV tylenol and toradol for 24 hours.  The patient was given clear liquids with ensure clears in PACU and encouraged with early ambulation. Once IV pain control dosing complete, she/he was transitioned to oral tylenol and motrin with oxycodone for breakthrough pain.  Once he tolerated 600cc of clear liquids, his diet was advanced to low fiber.  His caprini score was calculated to be 5.  On day of discharge, the patient was tolerating diet, ambulating well and pain controlled. He will follow up with Dr. Chun in 1 week.   66 y/o male, with no significant medical history, c/o abdominal pain, fever and was referred to Dr. Francisco. Patient was given multiple antibiotics and complained of watery BMs & fecal incontinence requiring diapers. Patient reports diagnosed  +Cdiff. Colonoscopy done 8/2020 revealed diverticulitis. 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 Chun. Presents to [facility] for a scheduled ERAS, robotic laparoscopic anterior resection, small bowel resection on 2/22/2021.    Patient went to the OR on 2/22 for a robotic converted to open LAR, SBR for coloenteric fistula, flexible sigmoidoscopy for sigmoid-small bowel fistula. Secondary to the coloenteric fistula the ch to remain in place for 5 days. The patient tolerated the procedure well (see operative report for full details). He received intrathecal morphine for pain control. He was placed on IV tylenol and toradol for 24 hours.  The patient was given clear liquids with ensure clears in PACU and encouraged with early ambulation. Once IV pain control dosing complete, she/he was transitioned to oral tylenol and motrin with oxycodone for breakthrough pain.  Once he tolerated 600cc of clear liquids, his diet was advanced to low fiber.  His caprini score was calculated to be 5. Patient developed oliguria during and rise in creatinine which prompted consult to nephrology service. Patient was started on Bumex which patient tolerated well and began to produce urine following therapy. Ch was removed and patient passed trial of void successfully. Patient will follow up with nephrology service as outpatient in 2--3 weeks.   On day of discharge, the patient was tolerating diet, ambulating well and pain controlled. He will follow up with Dr. Chun in 1 week.

## 2021-02-23 NOTE — PROGRESS NOTE ADULT - SUBJECTIVE AND OBJECTIVE BOX
Day 1 of Anesthesia Pain Management Service    SUBJECTIVE: Doing ok, some discomfort  Pain Scale Score:          [X] Refer to charted pain scores    THERAPY:    s/p   200  mcg PF morphine on 2\22\2021      MEDICATIONS  (STANDING):  dextrose 5% + sodium chloride 0.45% with potassium chloride 20 mEq/L 1000 milliLiter(s) (50 mL/Hr) IV Continuous <Continuous>  enoxaparin Injectable 40 milliGRAM(s) SubCutaneous daily  ketorolac   Injectable 15 milliGRAM(s) IV Push every 6 hours  magnesium oxide 1000 milliGRAM(s) Oral two times a day with meals  sodium chloride 0.9% lock flush 3 milliLiter(s) IV Push every 8 hours    MEDICATIONS  (PRN):  acetaminophen   Tablet .. 1000 milliGRAM(s) Oral every 6 hours PRN Mild Pain (1 - 3)  naloxone Injectable 0.1 milliGRAM(s) IV Push every 3 minutes PRN For ANY of the following changes in patient status:  A. RR LESS THAN 10 breaths per minute, B. Oxygen saturation LESS THAN 90%, C. Sedation score of 6  ondansetron Injectable 4 milliGRAM(s) IV Push every 6 hours PRN Nausea  oxyCODONE    IR 5 milliGRAM(s) Oral every 4 hours PRN Moderate Pain (4 - 6)      OBJECTIVE:    Sedation:        	[X] Alert	 [ ] Drowsy	[ ] Arousable      [ ] Asleep       [ ] Unresponsive    Side Effects:	[X] None 	[ ] Nausea	[ ] Vomiting         [ ] Pruritus  		[ ] Weakness            [ ] Numbness	          [ ] Other:    Vital Signs Last 24 Hrs  T(C): 36.7 (23 Feb 2021 06:41), Max: 36.7 (22 Feb 2021 18:56)  T(F): 98.1 (23 Feb 2021 06:41), Max: 98.1 (23 Feb 2021 06:41)  HR: 81 (23 Feb 2021 06:41) (70 - 97)  BP: 118/70 (23 Feb 2021 06:41) (105/64 - 133/63)  BP(mean): 89 (22 Feb 2021 17:00) (80 - 96)  RR: 18 (23 Feb 2021 06:41) (14 - 18)  SpO2: 97% (23 Feb 2021 06:41) (93% - 98%)    ASSESSMENT/ PLAN  [X] Patient transitioned to prn analgesics  [X] Pain management per primary service, pain service to sign off   [X]Documentation and Verification of current medications

## 2021-02-23 NOTE — DISCHARGE NOTE PROVIDER - NSDCCPCAREPLAN_GEN_ALL_CORE_FT
PRINCIPAL DISCHARGE DIAGNOSIS  Diagnosis: Fistula of intestine  Assessment and Plan of Treatment: Recovering from surgery   WOUND CARE: You may cover you abdominal incisions with gauze and tape. Please keep clean, dry and intact. Staples will be removed in the office with Dr. Chun.   BATHING: Please do not submerge wound underwater. You may shower and/or sponge bathe.  ACTIVITY: No heavy lifting or straining. Otherwise, you may return to your usual level of physical activity. If you are taking narcotic pain medication (such as Percocet), do NOT drive a car, operate machinery or make important decisions.  DIET: low fiber diet   NOTIFY YOUR SURGEON IF: You have any bleeding that does not stop, any pus draining from your wound, any fever (over 100.4 F) or chills, persistent nausea/vomiting, persistent diarrhea, or if your pain is not controlled on your discharge pain medications.  FOLLOW-UP:  1. Please call to make a follow-up appointment within one week of discharge with Dr. Chun.   2. Please follow up with your primary care physician in one week regarding your hospitalization.

## 2021-02-23 NOTE — DISCHARGE NOTE PROVIDER - NSDCFUADDINST_GEN_ALL_CORE_FT
Please take tylenol every 6 hours, and stagger with ibuprofen every 6 hours. This will allow you to alternate medications for more consistent pain control. For example: take a dose of tylenol at 8 am, then take a dose of ibuprofen at 11 am, then take a dose of tylenol at 2pm, and continue as needed.  Please use Tylenol and Ibuprofen as directed.

## 2021-02-23 NOTE — PHYSICAL THERAPY INITIAL EVALUATION ADULT - ADDITIONAL COMMENTS
Pt. is independent in all functional mobility and lives with his wife. Pt. states he is very active prior to this. Pt. does not need any PT needs or DME. Pt. is independent in all functional mobility and lives with his wife in a house with 5 steps to enter with two handrails. Pt. states he is very active prior to this. Pt. does not need any PT needs or DME.

## 2021-02-23 NOTE — DIETITIAN INITIAL EVALUATION ADULT. - PHYSCIAL ASSESSMENT
Skin: free of pressure injuries per nursing flow sheets, noted with midline abdominal incision well nourished

## 2021-02-23 NOTE — PROGRESS NOTE ADULT - ASSESSMENT
Assessment:  The patient is a 66y Male who is now s/p robotic converted to open LAR, SBR for coloenteric fistula, flexible sigmoidoscopy for sigmoid-small bowel fistula 2/22    Plan:  - Diet: CLD today  - pain control IV Tylenol, Toradol, PRN oxy  - pt received Duramorph preoperatively; monitor continuous pulse ox for 24hrs  - maintain Stringer until POD5   - remove L Ucath today  - DVT ppx, LVX  - OOB and ambulating as tolerated  - F/u AM labs    Red Surgery   [id number] Assessment:  The patient is a 66y Male who is now s/p robotic converted to open LAR, SBR for coloenteric fistula, flexible sigmoidoscopy for sigmoid-small bowel fistula 2/22    Plan:  - Diet: CLD today  - pain control IV Tylenol, Toradol, PRN oxy  - pt received Duramorph preoperatively; monitor continuous pulse ox for 24hrs  - maintain Stringer until POD5   - remove L Ucath today  - DVT ppx, LVX  - OOB and ambulating as tolerated  - F/u AM labs  - caprini 5    Red Surgery   [id number]

## 2021-02-23 NOTE — PROGRESS NOTE ADULT - SUBJECTIVE AND OBJECTIVE BOX
Urology PA Note    Called by surgery PA for surgery patient with decreased UOP despite taking in drinking and 500cc bolus. Patient is s/p robotic converted to open, LAR, small bowel resection, flexible sigmoidoscopy and Ucath placement. Ucath removed at bedside this morning by surgery team. Having hematuria. Ch drained 455cc overnight. Today only drained 100cc. Patient reports post-op incisional pain, controlled with pain medications but overall comfortable. No suprapubic fullness or tenderness. No peritoneal signs. Bladder scan 0cc. Ch flushed easily with clear pink urine and no clots. Balloon deflated and using lidocaine 1% jelly for lubrication, ch gently advanced down to the hub with ch to continue to drain minimal  now clear yellow urine. Balloon inflated 10cc sterile water and ch secured to leg. Ch draining minimal clear yellow urine. Patient tolerated procedure well. Once again confirmed with bladder scan patient with 0cc in bladder. Ch functioning well. Ch management per primary team

## 2021-02-23 NOTE — DISCHARGE NOTE PROVIDER - NSDCMRMEDTOKEN_GEN_ALL_CORE_FT
acetaminophen 500 mg oral tablet: 2 tab(s) orally every 6 hours  Centrum Silver oral tablet: 1 tab(s) orally once a day  ibuprofen 400 mg oral tablet: 1 tab(s) orally every 4 hours

## 2021-02-23 NOTE — DIETITIAN INITIAL EVALUATION ADULT. - OTHER INFO
Weights: pt reports intentional weight loss over the last few years. States he was ~ 250lbs however after [facility] he focused on eating healthier and exercising regularly. Reports now ~ 190lbs. Current dosing weight noted as 190.5lbs.     Pt currently on clear liquid diet. Reports some emesis yesterday in recovery, no further emesis or nausea. Pt drinking supplements, tolerating clears. Discussed typical diet progression, pt amendable to diet education. Provided low-fiber nutrition therapy including importance of avoiding  fiber rich foods, fresh fruits/vegetables, whole grains, and added fiber in processed foods. Discussed chewing foods well and adequate hydration and protein intake. Discussed gradual reintroduction of fiber back into diet once cleared by MD. Pt verbalized understanding and accepted written handout. Patient with no nutrition-related questions at this time. Made aware RD remains available as needed.

## 2021-02-23 NOTE — DISCHARGE NOTE PROVIDER - NSDCCPTREATMENT_GEN_ALL_CORE_FT
PRINCIPAL PROCEDURE  Procedure: Robot-assisted laparoscopic low anterior resection of rectum with laparotomy if indicated  Findings and Treatment:       SECONDARY PROCEDURE  Procedure: Flexible sigmoidoscopy  Findings and Treatment:     Procedure: Small bowel resection  Findings and Treatment:

## 2021-02-23 NOTE — PHYSICAL THERAPY INITIAL EVALUATION ADULT - DISCHARGE DISPOSITION, PT EVAL
Pt. has no skilled needs. Pt. is independent in all functional mobility and activities and is discharged from PT service./home

## 2021-02-23 NOTE — PROGRESS NOTE ADULT - SUBJECTIVE AND OBJECTIVE BOX
Surgery Progress Note    SUBJECTIVE: Pt seen and examined at bedside. No acute events overnight. This AM, patient comfortable and in no-apparent distress. Tolerating clear liquids without nausea or vomiting. Pain is controlled. -Gas/-BM.     Vital Signs Last 24 Hrs  T(C): 36.7 (23 Feb 2021 00:35), Max: 36.8 (22 Feb 2021 05:28)  T(F): 98 (23 Feb 2021 00:35), Max: 98.2 (22 Feb 2021 05:28)  HR: 72 (23 Feb 2021 00:35) (70 - 97)  BP: 108/65 (23 Feb 2021 00:35) (105/64 - 161/75)  BP(mean): 89 (22 Feb 2021 17:00) (80 - 96)  RR: 18 (23 Feb 2021 00:35) (14 - 18)  SpO2: 95% (23 Feb 2021 00:35) (93% - 98%)    Physical Exam:  General: NAD, resting comfortably in bed  Pulmonary: breathing comfortably on 2L nasal cannula  Cardiovascular: NSR  Abdominal: softly distended, nontender; port site incisions with steri strips c/d/i, lower midline incision covered with aquacel, w serosanguinous strikethrough  Genitourinary: Stringer in place draining blood tinged urine    LABS:                        14.8   13.25 )-----------( 260      ( 22 Feb 2021 13:37 )             46.3     02-22    141  |  103  |  18  ----------------------------<  156<H>  4.3   |  25  |  0.80    Ca    8.5      22 Feb 2021 13:37            INs and OUTs:    02-22-21 @ 07:01  -  02-23-21 @ 02:18  --------------------------------------------------------  IN: 540 mL / OUT: 505 mL / NET: 35 mL

## 2021-02-23 NOTE — DISCHARGE NOTE PROVIDER - CARE PROVIDER_API CALL
Dimas Chun)  ColonRectal Surgery; Surgery  [address], [address]  [place] [address]  Phone: [phone]  Fax: [phone]  Follow Up Time: 1 week

## 2021-02-24 DIAGNOSIS — N17.9 ACUTE KIDNEY FAILURE, UNSPECIFIED: ICD-10-CM

## 2021-02-24 LAB
ANION GAP SERPL CALC-SCNC: 12 MMOL/L — SIGNIFICANT CHANGE UP (ref 5–17)
ANION GAP SERPL CALC-SCNC: 16 MMOL/L — SIGNIFICANT CHANGE UP (ref 5–17)
ANION GAP SERPL CALC-SCNC: 8 MMOL/L — SIGNIFICANT CHANGE UP (ref 5–17)
BUN SERPL-MCNC: 36 MG/DL — HIGH (ref 7–23)
BUN SERPL-MCNC: 40 MG/DL — HIGH (ref 7–23)
BUN SERPL-MCNC: 50 MG/DL — HIGH (ref 7–23)
CALCIUM SERPL-MCNC: 8.4 MG/DL — SIGNIFICANT CHANGE UP (ref 8.4–10.5)
CALCIUM SERPL-MCNC: 8.6 MG/DL — SIGNIFICANT CHANGE UP (ref 8.4–10.5)
CALCIUM SERPL-MCNC: 8.8 MG/DL — SIGNIFICANT CHANGE UP (ref 8.4–10.5)
CHLORIDE SERPL-SCNC: 100 MMOL/L — SIGNIFICANT CHANGE UP (ref 96–108)
CHLORIDE SERPL-SCNC: 98 MMOL/L — SIGNIFICANT CHANGE UP (ref 96–108)
CHLORIDE SERPL-SCNC: 99 MMOL/L — SIGNIFICANT CHANGE UP (ref 96–108)
CO2 SERPL-SCNC: 23 MMOL/L — SIGNIFICANT CHANGE UP (ref 22–31)
CO2 SERPL-SCNC: 27 MMOL/L — SIGNIFICANT CHANGE UP (ref 22–31)
CO2 SERPL-SCNC: 28 MMOL/L — SIGNIFICANT CHANGE UP (ref 22–31)
CREAT SERPL-MCNC: 3.27 MG/DL — HIGH (ref 0.5–1.3)
CREAT SERPL-MCNC: 3.84 MG/DL — HIGH (ref 0.5–1.3)
CREAT SERPL-MCNC: 5.69 MG/DL — HIGH (ref 0.5–1.3)
GLUCOSE SERPL-MCNC: 110 MG/DL — HIGH (ref 70–99)
GLUCOSE SERPL-MCNC: 87 MG/DL — SIGNIFICANT CHANGE UP (ref 70–99)
GLUCOSE SERPL-MCNC: 87 MG/DL — SIGNIFICANT CHANGE UP (ref 70–99)
HCT VFR BLD CALC: 37.7 % — LOW (ref 39–50)
HCT VFR BLD CALC: 38 % — LOW (ref 39–50)
HGB BLD-MCNC: 12.3 G/DL — LOW (ref 13–17)
HGB BLD-MCNC: 12.5 G/DL — LOW (ref 13–17)
MAGNESIUM SERPL-MCNC: 2 MG/DL — SIGNIFICANT CHANGE UP (ref 1.6–2.6)
MAGNESIUM SERPL-MCNC: 2.1 MG/DL — SIGNIFICANT CHANGE UP (ref 1.6–2.6)
MAGNESIUM SERPL-MCNC: 2.4 MG/DL — SIGNIFICANT CHANGE UP (ref 1.6–2.6)
MCHC RBC-ENTMCNC: 29.3 PG — SIGNIFICANT CHANGE UP (ref 27–34)
MCHC RBC-ENTMCNC: 29.4 PG — SIGNIFICANT CHANGE UP (ref 27–34)
MCHC RBC-ENTMCNC: 32.6 GM/DL — SIGNIFICANT CHANGE UP (ref 32–36)
MCHC RBC-ENTMCNC: 32.9 GM/DL — SIGNIFICANT CHANGE UP (ref 32–36)
MCV RBC AUTO: 89.2 FL — SIGNIFICANT CHANGE UP (ref 80–100)
MCV RBC AUTO: 90.2 FL — SIGNIFICANT CHANGE UP (ref 80–100)
NRBC # BLD: 0 /100 WBCS — SIGNIFICANT CHANGE UP (ref 0–0)
NRBC # BLD: 0 /100 WBCS — SIGNIFICANT CHANGE UP (ref 0–0)
PHOSPHATE SERPL-MCNC: 3.2 MG/DL — SIGNIFICANT CHANGE UP (ref 2.5–4.5)
PHOSPHATE SERPL-MCNC: 3.2 MG/DL — SIGNIFICANT CHANGE UP (ref 2.5–4.5)
PHOSPHATE SERPL-MCNC: 4.1 MG/DL — SIGNIFICANT CHANGE UP (ref 2.5–4.5)
PLATELET # BLD AUTO: 231 K/UL — SIGNIFICANT CHANGE UP (ref 150–400)
PLATELET # BLD AUTO: 232 K/UL — SIGNIFICANT CHANGE UP (ref 150–400)
POTASSIUM SERPL-MCNC: 4.2 MMOL/L — SIGNIFICANT CHANGE UP (ref 3.5–5.3)
POTASSIUM SERPL-MCNC: 4.5 MMOL/L — SIGNIFICANT CHANGE UP (ref 3.5–5.3)
POTASSIUM SERPL-MCNC: 4.6 MMOL/L — SIGNIFICANT CHANGE UP (ref 3.5–5.3)
POTASSIUM SERPL-SCNC: 4.2 MMOL/L — SIGNIFICANT CHANGE UP (ref 3.5–5.3)
POTASSIUM SERPL-SCNC: 4.5 MMOL/L — SIGNIFICANT CHANGE UP (ref 3.5–5.3)
POTASSIUM SERPL-SCNC: 4.6 MMOL/L — SIGNIFICANT CHANGE UP (ref 3.5–5.3)
RBC # BLD: 4.18 M/UL — LOW (ref 4.2–5.8)
RBC # BLD: 4.26 M/UL — SIGNIFICANT CHANGE UP (ref 4.2–5.8)
RBC # FLD: 13.8 % — SIGNIFICANT CHANGE UP (ref 10.3–14.5)
RBC # FLD: 14.1 % — SIGNIFICANT CHANGE UP (ref 10.3–14.5)
SODIUM SERPL-SCNC: 136 MMOL/L — SIGNIFICANT CHANGE UP (ref 135–145)
SODIUM SERPL-SCNC: 137 MMOL/L — SIGNIFICANT CHANGE UP (ref 135–145)
SODIUM SERPL-SCNC: 138 MMOL/L — SIGNIFICANT CHANGE UP (ref 135–145)
WBC # BLD: 10.18 K/UL — SIGNIFICANT CHANGE UP (ref 3.8–10.5)
WBC # BLD: 11.69 K/UL — HIGH (ref 3.8–10.5)
WBC # FLD AUTO: 10.18 K/UL — SIGNIFICANT CHANGE UP (ref 3.8–10.5)
WBC # FLD AUTO: 11.69 K/UL — HIGH (ref 3.8–10.5)

## 2021-02-24 PROCEDURE — 74176 CT ABD & PELVIS W/O CONTRAST: CPT | Mod: 26

## 2021-02-24 PROCEDURE — 99223 1ST HOSP IP/OBS HIGH 75: CPT | Mod: GC

## 2021-02-24 PROCEDURE — 76770 US EXAM ABDO BACK WALL COMP: CPT | Mod: 26

## 2021-02-24 RX ORDER — BUMETANIDE 0.25 MG/ML
2 INJECTION INTRAMUSCULAR; INTRAVENOUS ONCE
Refills: 0 | Status: COMPLETED | OUTPATIENT
Start: 2021-02-24 | End: 2021-02-24

## 2021-02-24 RX ORDER — LANOLIN ALCOHOL/MO/W.PET/CERES
3 CREAM (GRAM) TOPICAL AT BEDTIME
Refills: 0 | Status: DISCONTINUED | OUTPATIENT
Start: 2021-02-24 | End: 2021-02-25

## 2021-02-24 RX ORDER — SODIUM CHLORIDE 9 MG/ML
1000 INJECTION INTRAMUSCULAR; INTRAVENOUS; SUBCUTANEOUS
Refills: 0 | Status: DISCONTINUED | OUTPATIENT
Start: 2021-02-24 | End: 2021-02-24

## 2021-02-24 RX ORDER — HEPARIN SODIUM 5000 [USP'U]/ML
5000 INJECTION INTRAVENOUS; SUBCUTANEOUS EVERY 8 HOURS
Refills: 0 | Status: DISCONTINUED | OUTPATIENT
Start: 2021-02-24 | End: 2021-02-28

## 2021-02-24 RX ADMIN — Medication 400 MILLIGRAM(S): at 12:42

## 2021-02-24 RX ADMIN — Medication 400 MILLIGRAM(S): at 00:50

## 2021-02-24 RX ADMIN — BENZOCAINE AND MENTHOL 1 LOZENGE: 5; 1 LIQUID ORAL at 22:31

## 2021-02-24 RX ADMIN — HEPARIN SODIUM 5000 UNIT(S): 5000 INJECTION INTRAVENOUS; SUBCUTANEOUS at 12:42

## 2021-02-24 RX ADMIN — SODIUM CHLORIDE 3 MILLILITER(S): 9 INJECTION INTRAMUSCULAR; INTRAVENOUS; SUBCUTANEOUS at 22:28

## 2021-02-24 RX ADMIN — SODIUM CHLORIDE 100 MILLILITER(S): 9 INJECTION INTRAMUSCULAR; INTRAVENOUS; SUBCUTANEOUS at 06:07

## 2021-02-24 RX ADMIN — SODIUM CHLORIDE 3 MILLILITER(S): 9 INJECTION INTRAMUSCULAR; INTRAVENOUS; SUBCUTANEOUS at 12:45

## 2021-02-24 RX ADMIN — SODIUM CHLORIDE 3 MILLILITER(S): 9 INJECTION INTRAMUSCULAR; INTRAVENOUS; SUBCUTANEOUS at 05:03

## 2021-02-24 RX ADMIN — Medication 400 MILLIGRAM(S): at 18:49

## 2021-02-24 RX ADMIN — SODIUM CHLORIDE 125 MILLILITER(S): 9 INJECTION INTRAMUSCULAR; INTRAVENOUS; SUBCUTANEOUS at 12:42

## 2021-02-24 RX ADMIN — BUMETANIDE 2 MILLIGRAM(S): 0.25 INJECTION INTRAMUSCULAR; INTRAVENOUS at 18:49

## 2021-02-24 RX ADMIN — HEPARIN SODIUM 5000 UNIT(S): 5000 INJECTION INTRAVENOUS; SUBCUTANEOUS at 22:31

## 2021-02-24 RX ADMIN — Medication 3 MILLIGRAM(S): at 22:31

## 2021-02-24 RX ADMIN — Medication 400 MILLIGRAM(S): at 05:14

## 2021-02-24 RX ADMIN — BENZOCAINE AND MENTHOL 1 LOZENGE: 5; 1 LIQUID ORAL at 12:42

## 2021-02-24 RX ADMIN — HEPARIN SODIUM 5000 UNIT(S): 5000 INJECTION INTRAVENOUS; SUBCUTANEOUS at 05:14

## 2021-02-24 RX ADMIN — BENZOCAINE AND MENTHOL 1 LOZENGE: 5; 1 LIQUID ORAL at 05:14

## 2021-02-24 RX ADMIN — PANTOPRAZOLE SODIUM 40 MILLIGRAM(S): 20 TABLET, DELAYED RELEASE ORAL at 12:42

## 2021-02-24 NOTE — PROGRESS NOTE ADULT - ASSESSMENT
66 year old male with anuria s/p lap converted to open LAR and SBR 2/22, bilateral ucaths now removed since 2/23 AM    -  -  -  - 66 year old male with anuria s/p lap converted to open LAR and SBR 2/22, bilateral ucaths now removed since 2/23 AM  CTAP and renal US images reviewed, no evidence of hydronephrosis --> non-obstructive etiology of anuria.    -no evidence of post-renal etiology of ANNA given no hydronephrosis/no evidence of obstruction --> no urologic intervention warranted  -recommend nephrology consult, likely intrinsic renal vs pre-renal etiology  -c/w ch catheter for now  -discussed with attending

## 2021-02-24 NOTE — CONSULT NOTE ADULT - SUBJECTIVE AND OBJECTIVE BOX
[facility] DIVISION OF KIDNEY DISEASES AND HYPERTENSION -- [phone]  -- INITIAL CONSULT NOTE  --------------------------------------------------------------------------------  HPI:        PAST HISTORY  --------------------------------------------------------------------------------  PAST MEDICAL & SURGICAL HISTORY:  History of diverticulitis    Fistula of intestine    H/O colonoscopy  8/2020    History of tonsillectomy  childhood    S/P right inguinal hernia repair  2012      FAMILY HISTORY:    PAST SOCIAL HISTORY:    ALLERGIES & MEDICATIONS  --------------------------------------------------------------------------------  Allergies    No Known Allergies    Intolerances      Standing Inpatient Medications  acetaminophen  IVPB .. 1000 milliGRAM(s) IV Intermittent every 6 hours  benzocaine 15 mG/menthol 3.6 mG (Sugar-Free) Lozenge 1 Lozenge Oral three times a day  heparin   Injectable 5000 Unit(s) SubCutaneous every 8 hours  melatonin 3 milliGRAM(s) Oral at bedtime  pantoprazole  Injectable 40 milliGRAM(s) IV Push daily  sodium chloride 0.9% lock flush 3 milliLiter(s) IV Push every 8 hours  sodium chloride 0.9%. 1000 milliLiter(s) IV Continuous <Continuous>    PRN Inpatient Medications      REVIEW OF SYSTEMS  --------------------------------------------------------------------------------  Gen: No fevers/chills  Skin: No rashes  Head/Eyes/Ears: Normal hearing,   Respiratory: No dyspnea, cough  CV: No chest pain  GI: No abdominal pain, diarrhea  : No dysuria, hematuria  MSK: No  edema  Heme: No easy bruising or bleeding  Psych: No significant depression    All other systems were reviewed and are negative, except as noted.    VITALS/PHYSICAL EXAM  --------------------------------------------------------------------------------  T(C): 36.5 (02-24-21 @ 09:51), Max: 36.7 (02-23-21 @ 16:16)  HR: 66 (02-24-21 @ 09:51) (65 - 77)  BP: 130/80 (02-24-21 @ 09:51) (126/73 - 151/81)  RR: 18 (02-24-21 @ 09:51) (18 - 18)  SpO2: 96% (02-24-21 @ 09:51) (93% - 96%)  Wt(kg): --    02-23-21 @ 07:01  -  02-24-21 @ 07:00  --------------------------------------------------------  IN: 4080 mL / OUT: 750 mL / NET: 3330 mL    Physical Exam:  	Gen: NAD  	HEENT: NG tube to drain  	Pulm: CTA B/L  	CV: S1S2  	Abd: distended, bandages present              : ch catheter +  	Ext: No LE edema B/L  	Neuro: Awake  	Skin: Warm and dry  	    LABS/STUDIES  --------------------------------------------------------------------------------              12.3   10.18 >-----------<  231      [02-24-21 @ 07:12]              37.7     137  |  98  |  40  ----------------------------<  87      [02-24-21 @ 07:02]  4.2   |  27  |  3.84        Ca     8.4     [02-24-21 @ 07:02]      Mg     2.1     [02-24-21 @ 07:02]      Phos  3.2     [02-24-21 @ 07:02]    Creatinine Trend:  SCr 3.84 [02-24 @ 07:02]  SCr 3.27 [02-23 @ 23:59]  SCr 2.40 [02-23 @ 17:39]  SCr 1.06 [02-23 @ 07:07]  SCr 0.80 [02-22 @ 13:37]    Urine Creatinine 7      [02-23-21 @ 18:31]  Urine Sodium <35      [02-23-21 @ 18:31]  Urine Osmolality 35      [02-23-21 @ 18:31] [facility] DIVISION OF KIDNEY DISEASES AND HYPERTENSION -- [phone]  -- INITIAL CONSULT NOTE  --------------------------------------------------------------------------------  HPI: 65-year-old male with no known medical history was admitted to [facility] on 2/22/21 for elective bowel fistula repair. Pt. was planned for a laparascopic surgery but was converted to open surgery due to adhesions. Intraoperatively, pt. noted to have lower BP readings as per anesthesia record. Nephrology team consulted for ANNA. Upon review of labs on [facility], Scr was 0.98 on 1/31/19. Scr on admission was 0.80. Scr increased to 2.40 on 2/23/21 and today has increased further to 3.84. Pt. denies any previous history of kidney disease. Denies any NSAIDs, OTC, or herbal medications. Deneis any dysuria, hematuria, or urinary hesitancy.    Pt. evaluated at bedside, in no acute distress. Complains of abdominal pain and distension.    PAST HISTORY  --------------------------------------------------------------------------------  PAST MEDICAL & SURGICAL HISTORY:  History of diverticulitis    Fistula of intestine    H/O colonoscopy  8/2020    History of tonsillectomy  childhood    S/P right inguinal hernia repair  2012      FAMILY HISTORY:    PAST SOCIAL HISTORY: retired, former high school .     ALLERGIES & MEDICATIONS  --------------------------------------------------------------------------------  Allergies    No Known Allergies    Intolerances      Standing Inpatient Medications  acetaminophen  IVPB .. 1000 milliGRAM(s) IV Intermittent every 6 hours  benzocaine 15 mG/menthol 3.6 mG (Sugar-Free) Lozenge 1 Lozenge Oral three times a day  heparin   Injectable 5000 Unit(s) SubCutaneous every 8 hours  melatonin 3 milliGRAM(s) Oral at bedtime  pantoprazole  Injectable 40 milliGRAM(s) IV Push daily  sodium chloride 0.9% lock flush 3 milliLiter(s) IV Push every 8 hours  sodium chloride 0.9%. 1000 milliLiter(s) IV Continuous <Continuous>    REVIEW OF SYSTEMS  --------------------------------------------------------------------------------  Gen: no fatigue  Respiratory: No dyspnea  CV: No chest pain  GI: see HPI  MSK: no LE edema  Neuro: No dizziness  Heme: No bleeding    All other systems were reviewed and are negative, except as noted.    VITALS/PHYSICAL EXAM  --------------------------------------------------------------------------------  T(C): 36.5 (02-24-21 @ 09:51), Max: 36.7 (02-23-21 @ 16:16)  HR: 66 (02-24-21 @ 09:51) (65 - 77)  BP: 130/80 (02-24-21 @ 09:51) (126/73 - 151/81)  RR: 18 (02-24-21 @ 09:51) (18 - 18)  SpO2: 96% (02-24-21 @ 09:51) (93% - 96%)  Wt(kg): --    02-23-21 @ 07:01  -  02-24-21 @ 07:00  --------------------------------------------------------  IN: 4080 mL / OUT: 750 mL / NET: 3330 mL    Physical Exam:  	Gen: NAD  	HEENT: NG tube to drain  	Pulm: CTA B/L  	CV: S1S2  	Abd: distended, bandages present              : ch catheter +  	Ext: No LE edema B/L  	Neuro: Awake  	Skin: Warm and dry  	  LABS/STUDIES  --------------------------------------------------------------------------------              12.3   10.18 >-----------<  231      [02-24-21 @ 07:12]              37.7     137  |  98  |  40  ----------------------------<  87      [02-24-21 @ 07:02]  4.2   |  27  |  3.84        Ca     8.4     [02-24-21 @ 07:02]      Mg     2.1     [02-24-21 @ 07:02]      Phos  3.2     [02-24-21 @ 07:02]    Creatinine Trend:  SCr 3.84 [02-24 @ 07:02]  SCr 3.27 [02-23 @ 23:59]  SCr 2.40 [02-23 @ 17:39]  SCr 1.06 [02-23 @ 07:07]  SCr 0.80 [02-22 @ 13:37]    Urine Creatinine 7      [02-23-21 @ 18:31]  Urine Sodium <35      [02-23-21 @ 18:31]  Urine Osmolality 35      [02-23-21 @ 18:31] [facility] DIVISION OF KIDNEY DISEASES AND HYPERTENSION -- [phone]  -- INITIAL CONSULT NOTE  --------------------------------------------------------------------------------  HPI: 65-year-old male with no known medical history was admitted to [facility] on 2/22/21 for elective bowel fistula repair. Pt. was planned for a laparascopic surgery but was converted to open surgery due to adhesions. Intraoperatively, pt. noted to have lower BP readings as per anesthesia record. Nephrology team consulted for ANNA. Upon review of labs on [facility], Scr was 0.98 on 1/31/19. Scr on admission was 0.80. Scr increased to 2.40 on 2/23/21 and has increased further to 3.84 today (2/24/21). Pt. evaluated at bedside, in no acute distress. Complains of abdominal pain/distension and decreased UOP. Pt. denies any previous history of kidney disease. Denies any NSAIDs, OTC, or herbal medications.   PAST HISTORY  --------------------------------------------------------------------------------  PAST MEDICAL & SURGICAL HISTORY:  History of diverticulitis    Fistula of intestine    H/O colonoscopy  8/2020    History of tonsillectomy  childhood    S/P right inguinal hernia repair  2012      FAMILY HISTORY:    PAST SOCIAL HISTORY: retired, former high school .     ALLERGIES & MEDICATIONS  --------------------------------------------------------------------------------  Allergies    No Known Allergies    Intolerances    Standing Inpatient Medications  acetaminophen  IVPB .. 1000 milliGRAM(s) IV Intermittent every 6 hours  benzocaine 15 mG/menthol 3.6 mG (Sugar-Free) Lozenge 1 Lozenge Oral three times a day  heparin   Injectable 5000 Unit(s) SubCutaneous every 8 hours  melatonin 3 milliGRAM(s) Oral at bedtime  pantoprazole  Injectable 40 milliGRAM(s) IV Push daily  sodium chloride 0.9% lock flush 3 milliLiter(s) IV Push every 8 hours  sodium chloride 0.9%. 1000 milliLiter(s) IV Continuous <Continuous>    REVIEW OF SYSTEMS  --------------------------------------------------------------------------------  Gen: no fatigue  Respiratory: No dyspnea  CV: No chest pain  GI: see HPI  MSK: no LE edema  Neuro: No dizziness  Heme: No bleeding    All other systems were reviewed and are negative, except as noted.    VITALS/PHYSICAL EXAM  --------------------------------------------------------------------------------  T(C): 36.5 (02-24-21 @ 09:51), Max: 36.7 (02-23-21 @ 16:16)  HR: 66 (02-24-21 @ 09:51) (65 - 77)  BP: 130/80 (02-24-21 @ 09:51) (126/73 - 151/81)  RR: 18 (02-24-21 @ 09:51) (18 - 18)  SpO2: 96% (02-24-21 @ 09:51) (93% - 96%)  Wt(kg): --    02-23-21 @ 07:01  -  02-24-21 @ 07:00  --------------------------------------------------------  IN: 4080 mL / OUT: 750 mL / NET: 3330 mL    Physical Exam:  	Gen: resting, NAD  	HEENT: NG tube to drain  	Pulm: CTA B/L  	CV: S1S2+  	Abd: distended, bandages present              : Stringer catheter +  	Ext: No LE edema B/L  	Neuro: Awake  	Skin: Warm and dry  	  LABS/STUDIES  --------------------------------------------------------------------------------              12.3   10.18 >-----------<  231      [02-24-21 @ 07:12]              37.7     137  |  98  |  40  ----------------------------<  87      [02-24-21 @ 07:02]  4.2   |  27  |  3.84        Ca     8.4     [02-24-21 @ 07:02]      Mg     2.1     [02-24-21 @ 07:02]      Phos  3.2     [02-24-21 @ 07:02]    Creatinine Trend:  SCr 3.84 [02-24 @ 07:02]  SCr 3.27 [02-23 @ 23:59]  SCr 2.40 [02-23 @ 17:39]  SCr 1.06 [02-23 @ 07:07]  SCr 0.80 [02-22 @ 13:37]    Urine Creatinine 7      [02-23-21 @ 18:31]  Urine Sodium <35      [02-23-21 @ 18:31]  Urine Osmolality 35      [02-23-21 @ 18:31]

## 2021-02-24 NOTE — CHART NOTE - NSCHARTNOTEFT_GEN_A_CORE
Pt with low urine output during the day yesterday. Pt was evaluated by urology, who flushed the Stringer and suggested that the etiology was pre-renal. Multiple bladder scans were done showing little to no urine in the bladder. The patient was given a total of 1.5L bolus in addition to mIVF, yielding 75cc of blood tinged urine for a total of 150cc for the day. An afternoon BMP showed an increase in Cr from 1.06 to 2.4. Urine electrolytes suggestive of pre-renal etiology. IVF were increased to 125cc/hr. Patient had an episode of emesis with abdominal distension. CXR demonstrated a large gastric bubble. NGT was placed with return of 400cc gastric fluid and relief of nausea. In the evening, pt had no additional urine output after 5 hours. Patient was normotensive with HR in the 70s. At this point in time, pateient still softly distended with no abdominal discomfort or tenderness. Bedside ultrasound was done to assess bladder volume, however unable to visualize bladder due to cedrick in the incision. Senior resident came to bedside to flush Stringer. Catheter flushed easily, producing an additional 20cc pink urine. Patient stood up with no increase in flow of urine. Stringer bag was changed. STAT labs were sent and pt was taken for a noncon CT AP. Labs returned with stable h&h, with increased Cr to 3.27. CTAP reviewed with chief resident, showing decompressed bladder with Stringer in place and chris-vesicular inflammation, with no acute pathology. Patient remained hemodynamically stable during this period.    Kelly Mancuso, PGY1  Red Surgery [id number] Pt with low urine output during the day yesterday. Pt was evaluated by urology, who flushed the Stringer and suggested that the etiology was pre-renal. Multiple bladder scans were done showing little to no urine in the bladder. The patient was given a total of 1.5L bolus in addition to mIVF, yielding 75cc of blood tinged urine for a total of 150cc for the day. An afternoon BMP showed an increase in Cr from 1.06 to 2.4. Urine electrolytes suggestive of pre-renal etiology. IVF were increased to 125cc/hr. Patient had an episode of emesis with abdominal distension. CXR demonstrated a large gastric bubble. NGT was placed with return of 400cc gastric fluid and relief of nausea. In the evening, pt had no additional urine output after 5 hours. Patient was normotensive with HR in the 70s. At this point in time, pateient still softly distended with no abdominal discomfort or tenderness. Bedside ultrasound was done to assess bladder volume, however unable to visualize bladder due to cedrick in the incision. Senior resident came to bedside to flush Stringer. Catheter flushed easily, producing an additional 20cc pink urine. Patient stood up with no increase in flow of urine. Stringer bag was changed. STAT labs were sent and pt was taken for a noncon CT AP. Labs returned with stable h&h, with increased Cr to 3.27. CTAP reviewed with chief resident, showing decompressed bladder with Stringer in place and chris-vesicular inflammation, with no acute pathology. Patient remained hemodynamically stable during this period. Patient left on IVF @125.    Kelly Mancuso, PGY1  Red Surgery [id number] Pt with low urine output during the day yesterday. Pt was evaluated by urology, who flushed the Stringer and suggested that the etiology was pre-renal. Multiple bladder scans were done showing little to no urine in the bladder. The patient was given a total of 1.5L bolus in addition to mIVF, yielding 75cc of blood tinged urine for a total of 150cc for the day. An afternoon BMP showed an increase in Cr from 1.06 to 2.4. Urine electrolytes suggestive of pre-renal etiology. IVF were increased to 125cc/hr. Patient had an episode of emesis with abdominal distension. CXR demonstrated a large gastric bubble. NGT was placed with return of 400cc gastric fluid and relief of nausea. In the evening, pt had no additional urine output after 5 hours. Patient was normotensive with HR in the 70s. At this point in time, patient still softly distended with no abdominal discomfort or tenderness. Bedside ultrasound was done to assess bladder volume, however unable to visualize bladder due to cedrick in the suprapubic incision. Senior resident came to bedside to flush Stringer. Catheter flushed easily, producing an additional 20cc pink urine. Patient stood up with no increase in flow of urine. Stringer bag was changed. STAT labs were sent and pt was taken for a noncon CT AP. Labs returned with stable h&h, with increased Cr to 3.27. CTAP reviewed with chief resident, showing decompressed bladder with Stringer in place and chris-vesicular inflammation, with no acute pathology. Patient remained hemodynamically stable during this period. Patient left on IVF @125.    Kelly Mancuso, PGY1  Red Surgery [id number] Pt with low urine output during the day yesterday. Pt was evaluated by urology, who flushed the Stringer and suggested that the etiology was pre-renal. Multiple bladder scans were done showing little to no urine in the bladder. The patient was given a total of 1.5L bolus in addition to mIVF, yielding 75cc of blood tinged urine for a total of 150cc for the day. An afternoon BMP showed an increase in Cr from 1.06 to 2.4. IVF were increased to 125cc/hr. Urine electrolytes suggestive of post-renal etiology. Patient had an episode of emesis with abdominal distension. CXR demonstrated a large gastric bubble. NGT was placed with return of 400cc gastric fluid and relief of nausea. In the evening, pt had no additional urine output after 5 hours. Patient was normotensive with HR in the 70s. At this point in time, patient still softly distended with no abdominal discomfort or tenderness. Bedside ultrasound was done to assess bladder volume, however unable to visualize bladder due to cedrick in the suprapubic incision. Senior resident came to bedside to flush Stringer. Catheter flushed easily, producing an additional 20cc pink urine. Patient stood up with no increase in flow of urine. Stringer bag was changed. STAT labs were sent and pt was taken for a noncon CT AP. Labs returned with stable h&h, with increased Cr to 3.27. CTAP reviewed with chief resident, showing decompressed bladder with Stringer in place and chris-vesicular inflammation, with no acute pathology. Patient remained hemodynamically stable during this period. Patient left on IVF @125.    Kelly Mancuso, PGY1  Red Surgery [id number] Pt with low urine output during the day yesterday. Pt was evaluated by urology, who flushed the Stringer and suggested that the etiology was pre-renal. Multiple bladder scans were done showing little to no urine in the bladder. The patient was given a total of 1.5L bolus in addition to mIVF, yielding 75cc of blood tinged urine for a total of 150cc for the day. An afternoon BMP showed an increase in Cr from 1.06 to 2.4. IVF were increased to 125cc/hr. Urine electrolytes suggestive of post-renal etiology. Patient had an episode of emesis with abdominal distension. CXR demonstrated a large gastric bubble. NGT was placed with return of 400cc gastric fluid and relief of nausea. In the evening, pt had no additional urine output after 5 hours. Patient was normotensive with HR in the 70s. At this point in time, patient still softly distended with no abdominal discomfort or tenderness. Bedside ultrasound was done to assess bladder volume, however unable to visualize bladder due to cedrick in the suprapubic incision. Senior resident came to bedside to flush Stringer. Catheter flushed easily, producing an additional 20cc pink urine. Patient stood up with no increase in flow of urine. Stringer bag was changed. STAT labs were sent and pt was taken for a noncon CT AP. Labs returned with stable h&h, with increased Cr to 3.27. CTAP reviewed with chief resident and radiology, showing decompressed bladder with Stringer in place and chris-vesicular inflammation, with no acute pathology. Patient remained hemodynamically stable during this period. Patient left on IVF @125.    Kelly Mancuso, PGY1  Red Surgery [id number]

## 2021-02-24 NOTE — PROGRESS NOTE ADULT - SUBJECTIVE AND OBJECTIVE BOX
SURGERY DAILY PROGRESS NOTE:       Subjective / Overnight events:  Low urine output overnight.  CT scan performed.  Labs obtained, pt w rising creatinine.  Urology called, recommended renal u/s.  Pain controlled.  Denies N/V.  No flatus, no BM      Objective:      MEDICATIONS  (STANDING):  acetaminophen  IVPB .. 1000 milliGRAM(s) IV Intermittent every 6 hours  benzocaine 15 mG/menthol 3.6 mG (Sugar-Free) Lozenge 1 Lozenge Oral three times a day  heparin   Injectable 5000 Unit(s) SubCutaneous every 8 hours  melatonin 3 milliGRAM(s) Oral at bedtime  pantoprazole  Injectable 40 milliGRAM(s) IV Push daily  sodium chloride 0.9% lock flush 3 milliLiter(s) IV Push every 8 hours  sodium chloride 0.9%. 1000 milliLiter(s) (125 mL/Hr) IV Continuous <Continuous>    MEDICATIONS  (PRN):      Vital Signs Last 24 Hrs  T(C): 36.5 (24 Feb 2021 09:51), Max: 36.7 (23 Feb 2021 16:16)  T(F): 97.7 (24 Feb 2021 09:51), Max: 98.1 (23 Feb 2021 16:16)  HR: 66 (24 Feb 2021 09:51) (65 - 77)  BP: 130/80 (24 Feb 2021 09:51) (126/73 - 151/81)  BP(mean): --  RR: 18 (24 Feb 2021 09:51) (18 - 18)  SpO2: 96% (24 Feb 2021 09:51) (93% - 96%)    I&O's Detail    23 Feb 2021 07:01  -  24 Feb 2021 07:00  --------------------------------------------------------  IN:    IV PiggyBack: 200 mL    Lactated Ringers: 1250 mL    Lactated Ringers: 200 mL    Lactated Ringers Bolus: 1500 mL    Oral Fluid: 780 mL    sodium chloride 0.45% w/ Additives: 150 mL  Total IN: 4080 mL    OUT:    Indwelling Catheter - Urethral (mL): 150 mL    Nasogastric/Oral tube (mL): 600 mL  Total OUT: 750 mL    Total NET: 3330 mL          Daily     Daily     LABS:                        12.3   10.18 )-----------( 231      ( 24 Feb 2021 07:12 )             37.7     02-24    137  |  98  |  40<H>  ----------------------------<  87  4.2   |  27  |  3.84<H>    Ca    8.4      24 Feb 2021 07:02  Phos  3.2     02-24  Mg     2.1     02-24                	Skin: Warm, without rashes    Physical Exam:  General: NAD, resting comfortably in bed  Pulmonary: breathing comfortably on 2L nasal cannula  Cardiovascular: NSR  Abdominal: softly distended, nontender; port site incisions with steri strips c/d/i  Genitourinary: Stringer in place draining blood tinged urine

## 2021-02-24 NOTE — PROGRESS NOTE ADULT - SUBJECTIVE AND OBJECTIVE BOX
The patient was seen and examined at bedside.  Denies complaints of flank pain.  We were called ot evaluate the patient for no urine output overnight.  His ureteral catheters were  removed on 2/23 AM and he has only made 100cc during the day.    T(C): 36.6 (02-24-21 @ 05:16), Max: 36.7 (02-23-21 @ 16:16)  HR: 72 (02-24-21 @ 05:16) (65 - 77)  BP: 132/75 (02-24-21 @ 05:16) (126/73 - 151/81)  RR: 18 (02-24-21 @ 05:16) (18 - 18)  SpO2: 94% (02-24-21 @ 05:16) (93% - 98%)  Wt(kg): --    Physical Exam:    General: NAD, A+Ox3  Abdomen: soft, +incisional tenderness, +distension      Overnight outputs:    NGT - 600cc    Stringer - 0cc                            12.3   10.18 )-----------( 231               37.7     136  |  100  |  36  ----------------------------<  110  4.5   |  28  |  3.27    Ca    8.6  Phos  3.2  Mg     2.0     The patient was seen and examined at bedside.  Denies complaints of flank pain.      24 hour events:  We were called to evaluate the patient for having no urine output overnight.  His ureteral catheters were removed on 2/23 AM and he has only made 100cc during the day.    T(C): 36.6 (02-24-21 @ 05:16), Max: 36.7 (02-23-21 @ 16:16)  HR: 72 (02-24-21 @ 05:16) (65 - 77)  BP: 132/75 (02-24-21 @ 05:16) (126/73 - 151/81)  RR: 18 (02-24-21 @ 05:16) (18 - 18)  SpO2: 94% (02-24-21 @ 05:16) (93% - 98%)  Wt(kg): --    Physical Exam:    General: NAD, A+Ox3  Abdomen: soft, +incisional tenderness, +distension      Overnight outputs:    NGT - 600cc    Stringer - 0cc                            12.3   10.18 )-----------( 231               37.7     136  |  100  |  36  ----------------------------<  110  4.5   |  28  |  3.27    Ca    8.6  Phos  3.2  Mg     2.0

## 2021-02-24 NOTE — CONSULT NOTE ADULT - PROBLEM SELECTOR RECOMMENDATION 9
Pt. with ANNA in the setting of relative hypotension. Scr on admission (2/22/21) was 0.80. Scr increased to 2.40 on 2/23/21. Scr has further increased to 3.84 today. Urine electrolytes consistent with pre-renal ANNA. US Kidney negative for hydronephrosis. Pt. with likely hemodynamically mediated ANNA/ATN. Check UA and spot urine TP/Cr ratio. Pt. on IVF as per primary team. Will need to consider HD if renal failure continues to worsen. Monitor labs and urine output. Avoid potential nephrotoxins. Dose medications as per eGFR.    If any questions, please feel free to contact me  Sere Walls   Nephrology Fellow  [phone]  (After 5 pm or on weekends please page the on-call fellow) Pt. with ANNA in the setting of relative hypotension. Scr on admission (2/22/21) was 0.80. Scr increased to 2.40 on 2/23/21. Scr has further increased to 3.84 today. Urine electrolytes consistent with pre-renal ANNA. US Kidney negative for hydronephrosis. Pt. with likely hemodynamically mediated ANNA/ATN. Check UA and spot urine TP/Cr ratio. Discontinue IVF. Recommend Bumex 2mg IV x 1. Will need to consider HD if renal failure continues to worsen. Monitor labs and urine output. Avoid potential nephrotoxins. Dose medications as per eGFR.    If any questions, please feel free to contact me  Sree Walls   Nephrology Fellow  [phone]  (After 5 pm or on weekends please page the on-call fellow) Pt. with ANNA in the setting of recent surgery and low BP readings. Scr on admission (2/22/21) was 0.80. Scr increased to 2.40 on 2/23/21 and has further increased to 3.84 today (2/24/21). US Kidney negative for hydronephrosis. Pt. with likely hemodynamically mediated ANNA/ATN. Check UA and spot urine TP/CR. Pt. on IVF as per primary team. Pt. oliguric, recommend trial of IV Bumex 2 mg once. Will need to consider HD if renal failure continues to worsen. Monitor labs and urine output. Avoid potential nephrotoxins. Dose medications as per eGFR.    If any questions, please feel free to contact me  Sree Walls   Nephrology Fellow  [phone]  (After 5 pm or on weekends please page the on-call fellow)

## 2021-02-24 NOTE — PROGRESS NOTE ADULT - ASSESSMENT
A/P:   66y Male POD #2 s/p robotic converted to open LAR, SBR for coloenteric fistula, flexible sigmoidoscopy for sigmoid-small bowel fistula     Plan:  - Diet:  NPO  - renal u/s, CT A/P with rectal contrast  - nephrology consult  - pain control IV Tylenol, Toradol, PRN oxy  - maintain Stringer until POD5   - DVT ppx, LVX  - OOB and ambulating as tolerated  - F/u AM labs  - caprini 5    Red Surgery   [id number]

## 2021-02-25 LAB
ANION GAP SERPL CALC-SCNC: 13 MMOL/L — SIGNIFICANT CHANGE UP (ref 5–17)
ANION GAP SERPL CALC-SCNC: 15 MMOL/L — SIGNIFICANT CHANGE UP (ref 5–17)
BASOPHILS # BLD AUTO: 0.02 K/UL — SIGNIFICANT CHANGE UP (ref 0–0.2)
BASOPHILS NFR BLD AUTO: 0.2 % — SIGNIFICANT CHANGE UP (ref 0–2)
BUN SERPL-MCNC: 56 MG/DL — HIGH (ref 7–23)
BUN SERPL-MCNC: 65 MG/DL — HIGH (ref 7–23)
CALCIUM SERPL-MCNC: 8.6 MG/DL — SIGNIFICANT CHANGE UP (ref 8.4–10.5)
CALCIUM SERPL-MCNC: 8.8 MG/DL — SIGNIFICANT CHANGE UP (ref 8.4–10.5)
CHLORIDE SERPL-SCNC: 96 MMOL/L — SIGNIFICANT CHANGE UP (ref 96–108)
CHLORIDE SERPL-SCNC: 96 MMOL/L — SIGNIFICANT CHANGE UP (ref 96–108)
CO2 SERPL-SCNC: 25 MMOL/L — SIGNIFICANT CHANGE UP (ref 22–31)
CO2 SERPL-SCNC: 27 MMOL/L — SIGNIFICANT CHANGE UP (ref 22–31)
CREAT SERPL-MCNC: 6.82 MG/DL — HIGH (ref 0.5–1.3)
CREAT SERPL-MCNC: 8.4 MG/DL — HIGH (ref 0.5–1.3)
EOSINOPHIL # BLD AUTO: 0.07 K/UL — SIGNIFICANT CHANGE UP (ref 0–0.5)
EOSINOPHIL NFR BLD AUTO: 0.7 % — SIGNIFICANT CHANGE UP (ref 0–6)
GLUCOSE SERPL-MCNC: 109 MG/DL — HIGH (ref 70–99)
GLUCOSE SERPL-MCNC: 66 MG/DL — LOW (ref 70–99)
HBV SURFACE AG SER-ACNC: SIGNIFICANT CHANGE UP
HCT VFR BLD CALC: 39.3 % — SIGNIFICANT CHANGE UP (ref 39–50)
HGB BLD-MCNC: 12.6 G/DL — LOW (ref 13–17)
IMM GRANULOCYTES NFR BLD AUTO: 0.6 % — SIGNIFICANT CHANGE UP (ref 0–1.5)
LYMPHOCYTES # BLD AUTO: 0.82 K/UL — LOW (ref 1–3.3)
LYMPHOCYTES # BLD AUTO: 7.7 % — LOW (ref 13–44)
MAGNESIUM SERPL-MCNC: 2.6 MG/DL — SIGNIFICANT CHANGE UP (ref 1.6–2.6)
MAGNESIUM SERPL-MCNC: 2.7 MG/DL — HIGH (ref 1.6–2.6)
MCHC RBC-ENTMCNC: 29.3 PG — SIGNIFICANT CHANGE UP (ref 27–34)
MCHC RBC-ENTMCNC: 32.1 GM/DL — SIGNIFICANT CHANGE UP (ref 32–36)
MCV RBC AUTO: 91.4 FL — SIGNIFICANT CHANGE UP (ref 80–100)
MONOCYTES # BLD AUTO: 0.89 K/UL — SIGNIFICANT CHANGE UP (ref 0–0.9)
MONOCYTES NFR BLD AUTO: 8.4 % — SIGNIFICANT CHANGE UP (ref 2–14)
NEUTROPHILS # BLD AUTO: 8.79 K/UL — HIGH (ref 1.8–7.4)
NEUTROPHILS NFR BLD AUTO: 82.4 % — HIGH (ref 43–77)
NRBC # BLD: 0 /100 WBCS — SIGNIFICANT CHANGE UP (ref 0–0)
PHOSPHATE SERPL-MCNC: 4.4 MG/DL — SIGNIFICANT CHANGE UP (ref 2.5–4.5)
PHOSPHATE SERPL-MCNC: 4.5 MG/DL — SIGNIFICANT CHANGE UP (ref 2.5–4.5)
PLATELET # BLD AUTO: 240 K/UL — SIGNIFICANT CHANGE UP (ref 150–400)
POTASSIUM SERPL-MCNC: 4.6 MMOL/L — SIGNIFICANT CHANGE UP (ref 3.5–5.3)
POTASSIUM SERPL-MCNC: 4.6 MMOL/L — SIGNIFICANT CHANGE UP (ref 3.5–5.3)
POTASSIUM SERPL-SCNC: 4.6 MMOL/L — SIGNIFICANT CHANGE UP (ref 3.5–5.3)
POTASSIUM SERPL-SCNC: 4.6 MMOL/L — SIGNIFICANT CHANGE UP (ref 3.5–5.3)
RBC # BLD: 4.3 M/UL — SIGNIFICANT CHANGE UP (ref 4.2–5.8)
RBC # FLD: 13.7 % — SIGNIFICANT CHANGE UP (ref 10.3–14.5)
SODIUM SERPL-SCNC: 136 MMOL/L — SIGNIFICANT CHANGE UP (ref 135–145)
SODIUM SERPL-SCNC: 136 MMOL/L — SIGNIFICANT CHANGE UP (ref 135–145)
WBC # BLD: 10.65 K/UL — HIGH (ref 3.8–10.5)
WBC # FLD AUTO: 10.65 K/UL — HIGH (ref 3.8–10.5)

## 2021-02-25 PROCEDURE — 99233 SBSQ HOSP IP/OBS HIGH 50: CPT | Mod: GC

## 2021-02-25 RX ORDER — BUMETANIDE 0.25 MG/ML
2 INJECTION INTRAMUSCULAR; INTRAVENOUS ONCE
Refills: 0 | Status: COMPLETED | OUTPATIENT
Start: 2021-02-25 | End: 2021-02-25

## 2021-02-25 RX ORDER — PANTOPRAZOLE SODIUM 20 MG/1
40 TABLET, DELAYED RELEASE ORAL
Refills: 0 | Status: DISCONTINUED | OUTPATIENT
Start: 2021-02-25 | End: 2021-02-25

## 2021-02-25 RX ORDER — LANOLIN ALCOHOL/MO/W.PET/CERES
3 CREAM (GRAM) TOPICAL AT BEDTIME
Refills: 0 | Status: DISCONTINUED | OUTPATIENT
Start: 2021-02-25 | End: 2021-02-28

## 2021-02-25 RX ADMIN — HEPARIN SODIUM 5000 UNIT(S): 5000 INJECTION INTRAVENOUS; SUBCUTANEOUS at 13:44

## 2021-02-25 RX ADMIN — BUMETANIDE 2 MILLIGRAM(S): 0.25 INJECTION INTRAMUSCULAR; INTRAVENOUS at 17:51

## 2021-02-25 RX ADMIN — HEPARIN SODIUM 5000 UNIT(S): 5000 INJECTION INTRAVENOUS; SUBCUTANEOUS at 20:44

## 2021-02-25 RX ADMIN — Medication 3 MILLIGRAM(S): at 21:34

## 2021-02-25 RX ADMIN — SODIUM CHLORIDE 3 MILLILITER(S): 9 INJECTION INTRAMUSCULAR; INTRAVENOUS; SUBCUTANEOUS at 13:10

## 2021-02-25 RX ADMIN — SODIUM CHLORIDE 3 MILLILITER(S): 9 INJECTION INTRAMUSCULAR; INTRAVENOUS; SUBCUTANEOUS at 05:51

## 2021-02-25 RX ADMIN — BENZOCAINE AND MENTHOL 1 LOZENGE: 5; 1 LIQUID ORAL at 20:44

## 2021-02-25 RX ADMIN — BENZOCAINE AND MENTHOL 1 LOZENGE: 5; 1 LIQUID ORAL at 05:53

## 2021-02-25 RX ADMIN — SODIUM CHLORIDE 3 MILLILITER(S): 9 INJECTION INTRAMUSCULAR; INTRAVENOUS; SUBCUTANEOUS at 20:45

## 2021-02-25 RX ADMIN — HEPARIN SODIUM 5000 UNIT(S): 5000 INJECTION INTRAVENOUS; SUBCUTANEOUS at 05:53

## 2021-02-25 NOTE — PROGRESS NOTE ADULT - SUBJECTIVE AND OBJECTIVE BOX
Surgery Progress Note    SUBJECTIVE: Pt seen and examined at bedside. IVF discontinued yesterday. No acute events overnight. Patient given 2mg IV Bumex, however continues to be anuric. Patient in no distress. Potassium increasing. NGT continues to drain dark bilious fluid.      Vital Signs Last 24 Hrs  T(C): 36.5 (25 Feb 2021 00:49), Max: 36.7 (24 Feb 2021 16:24)  T(F): 97.7 (25 Feb 2021 00:49), Max: 98 (24 Feb 2021 16:24)  HR: 84 (25 Feb 2021 00:49) (66 - 84)  BP: 145/77 (25 Feb 2021 00:49) (126/73 - 146/78)  BP(mean): --  RR: 18 (25 Feb 2021 00:49) (18 - 18)  SpO2: 93% (25 Feb 2021 00:49) (93% - 96%)    Physical Exam:  General: NAD, resting comfortably in bed  Pulmonary: breathing comfortably on 2L nasal cannula  Cardiovascular: NSR  Abdominal: softly distended, nontender; port site incisions with steri strips c/d/i  Genitourinary: Stringer in place draining blood tinged urine    LABS:                        12.3   10.18 )-----------( 231      ( 24 Feb 2021 07:12 )             37.7     02-24    138  |  99  |  50<H>  ----------------------------<  87  4.6   |  23  |  5.69<H>    Ca    8.8      24 Feb 2021 20:39  Phos  4.1     02-24  Mg     2.4     02-24            INs and OUTs:    02-23-21 @ 07:01  -  02-24-21 @ 07:00  --------------------------------------------------------  IN: 4080 mL / OUT: 750 mL / NET: 3330 mL    02-24-21 @ 07:01  -  02-25-21 @ 00:56  --------------------------------------------------------  IN: 200 mL / OUT: 700 mL / NET: -500 mL

## 2021-02-25 NOTE — PROGRESS NOTE ADULT - SUBJECTIVE AND OBJECTIVE BOX
[facility] DIVISION OF KIDNEY DISEASES AND HYPERTENSION -- FOLLOW UP NOTE  --------------------------------------------------------------------------------  HPI: 65-year-old male with no known medical history was admitted to [facility] on 2/22/21 for elective bowel fistula repair. Pt. was planned for a laparascopic surgery but was converted to open surgery due to adhesions. Intraoperatively, pt. noted to have lower BP readings as per anesthesia record. Nephrology team consulted for ANNA. Upon review of labs on [facility], Scr was 0.98 on 1/31/19. Scr on admission was 0.80. Scr increased to 2.40 on 2/23/21. Scr has increased further to 6.82 today.    Pt. evaluated at bedside, sitting in chair. Pt. with minimal urine output despite IV Bumex yesterday.    PAST HISTORY  --------------------------------------------------------------------------------  No significant changes to PMH, PSH, FHx, SHx, unless otherwise noted    ALLERGIES & MEDICATIONS  --------------------------------------------------------------------------------  Allergies    No Known Allergies    Intolerances    Standing Inpatient Medications  benzocaine 15 mG/menthol 3.6 mG (Sugar-Free) Lozenge 1 Lozenge Oral three times a day  heparin   Injectable 5000 Unit(s) SubCutaneous every 8 hours  sodium chloride 0.9% lock flush 3 milliLiter(s) IV Push every 8 hours    REVIEW OF SYSTEMS  --------------------------------------------------------------------------------  Gen: no fatigue  Respiratory: No dyspnea  CV: No chest pain  GI: see HPI  MSK: no LE edema  Neuro: No dizziness  Heme: No bleeding    All other systems were reviewed and are negative, except as noted.    VITALS/PHYSICAL EXAM  --------------------------------------------------------------------------------  T(C): 36.5 (02-25-21 @ 14:05), Max: 36.7 (02-24-21 @ 16:24)  HR: 70 (02-25-21 @ 14:05) (70 - 97)  BP: 143/76 (02-25-21 @ 14:05) (138/82 - 149/77)  RR: 18 (02-25-21 @ 14:05) (18 - 18)  SpO2: 97% (02-25-21 @ 14:05) (93% - 97%)  Wt(kg): --    02-24-21 @ 07:01  -  02-25-21 @ 07:00  --------------------------------------------------------  IN: 200 mL / OUT: 700 mL / NET: -500 mL    Physical Exam:  	Gen: resting, NAD  	HEENT: MMM  	Pulm: clear to auscultation B/L  	CV: S1S2+  	Abd: distended, bandages present              : Stringer catheter +  	Ext: No LE edema B/L  	Neuro: Awake  	Skin: Warm and dry  	  LABS/STUDIES  --------------------------------------------------------------------------------              12.6   10.65 >-----------<  240      [02-25-21 @ 06:57]              39.3     136  |  96  |  56  ----------------------------<  66      [02-25-21 @ 06:55]  4.6   |  27  |  6.82        Ca     8.6     [02-25-21 @ 06:55]      Mg     2.6     [02-25-21 @ 06:55]      Phos  4.5     [02-25-21 @ 06:55]    Creatinine Trend:  SCr 6.82 [02-25 @ 06:55]  SCr 5.69 [02-24 @ 20:39]  SCr 3.84 [02-24 @ 07:02]  SCr 3.27 [02-23 @ 23:59]  SCr 2.40 [02-23 @ 17:39]    Urine Creatinine 7      [02-23-21 @ 18:31]  Urine Sodium <35      [02-23-21 @ 18:31]  Urine Osmolality 35      [02-23-21 @ 18:31]         [facility] DIVISION OF KIDNEY DISEASES AND HYPERTENSION -- FOLLOW UP NOTE  --------------------------------------------------------------------------------  HPI: 65-year-old male with no known medical history was admitted to [facility] on 2/22/21 for elective bowel fistula repair. Pt. was planned for a laparascopic surgery but was converted to open surgery due to adhesions. Intraoperatively, pt. noted to have lower BP readings as per anesthesia record. Nephrology team consulted for ANNA. Upon review of labs on [facility], Scr was 0.98 on 1/31/19. Scr on admission was 0.80. Scr increased to 2.40 on 2/23/21. Scr has increased further to 6.82 today.    Pt. evaluated at bedside, sitting in chair. Pt. with minimal urine output despite IV Bumex yesterday.    PAST HISTORY  --------------------------------------------------------------------------------  No significant changes to PMH, PSH, FHx, SHx, unless otherwise noted    ALLERGIES & MEDICATIONS  --------------------------------------------------------------------------------  Allergies    No Known Allergies    Intolerances    Standing Inpatient Medications  benzocaine 15 mG/menthol 3.6 mG (Sugar-Free) Lozenge 1 Lozenge Oral three times a day  heparin   Injectable 5000 Unit(s) SubCutaneous every 8 hours  sodium chloride 0.9% lock flush 3 milliLiter(s) IV Push every 8 hours    REVIEW OF SYSTEMS  --------------------------------------------------------------------------------  Gen: no fatigue  Respiratory: No dyspnea  CV: No chest pain  GI: see HPI  MSK: no LE edema  Neuro: No dizziness  Heme: No bleeding    All other systems were reviewed and are negative, except as noted.    VITALS/PHYSICAL EXAM  --------------------------------------------------------------------------------  T(C): 36.5 (02-25-21 @ 14:05), Max: 36.7 (02-24-21 @ 16:24)  HR: 70 (02-25-21 @ 14:05) (70 - 97)  BP: 143/76 (02-25-21 @ 14:05) (138/82 - 149/77)  RR: 18 (02-25-21 @ 14:05) (18 - 18)  SpO2: 97% (02-25-21 @ 14:05) (93% - 97%)  Wt(kg): --    02-24-21 @ 07:01  -  02-25-21 @ 07:00  --------------------------------------------------------  IN: 200 mL / OUT: 700 mL / NET: -500 mL    Physical Exam:  	Gen: resting, NAD  	HEENT: MMM  	Pulm: clear to auscultation B/L  	CV: S1S2+  	Abd: distended, bandages present              : Stringer catheter +  	Ext: No LE edema B/L  	Neuro: Awake  	Skin: Warm and dry  	  LABS/STUDIES  --------------------------------------------------------------------------------              12.6   10.65 >-----------<  240      [02-25-21 @ 06:57]              39.3     136  |  96  |  56  ----------------------------<  66      [02-25-21 @ 06:55]  4.6   |  27  |  6.82        Ca     8.6     [02-25-21 @ 06:55]      Mg     2.6     [02-25-21 @ 06:55]      Phos  4.5     [02-25-21 @ 06:55]    Creatinine Trend:  SCr 6.82 [02-25 @ 06:55]  SCr 5.69 [02-24 @ 20:39]  SCr 3.84 [02-24 @ 07:02]  SCr 3.27 [02-23 @ 23:59]  SCr 2.40 [02-23 @ 17:39]    Urine Creatinine 7      [02-23-21 @ 18:31]  Urine Sodium <35      [02-23-21 @ 18:31]  Urine Osmolality 35      [02-23-21 @ 18:31]

## 2021-02-25 NOTE — PROGRESS NOTE ADULT - PROBLEM SELECTOR PLAN 1
Pt. with ANNA in the setting of recent surgery and low BP readings. Scr on admission (2/22/21) was 0.80. Scr increased to 2.40 on 2/23/21. Scr has increased further to 6.84 today. Urine electrolytes consistent with pre-renal ANNA. US Kidney negative for hydronephrosis. Pt. with likely hemodynamically mediated ANNA/ATN. Check UA and spot urine TP/CR. Pt. oliguric, recommend repeat trial of IV Bumex 2 mg once. Will need to consider HD if renal failure continues to worsen. Monitor labs and urine output. Avoid potential nephrotoxins. Dose medications as per eGFR.    If any questions, please feel free to contact me  Sree Walls   Nephrology Fellow  [phone]  (After 5 pm or on weekends please page the on-call fellow) Pt. with ANNA in the setting of recent surgery and low BP readings. Scr on admission (2/22/21) was 0.80. Scr increased to 2.40 on 2/23/21. Scr has increased further to 6.84 today. US Kidney negative for hydronephrosis. Pt. with hemodynamically mediated ANNA/ATN. Check UA and spot urine TP/CR. Pt. oliguric, recommend IV Bumex 2 mg once. Will need to consider HD if renal failure continues to worsen. Monitor labs and urine output. Avoid potential nephrotoxins. Dose medications as per eGFR.    If any questions, please feel free to contact me  Sree Walls   Nephrology Fellow  [phone]  (After 5 pm or on weekends please page the on-call fellow)

## 2021-02-25 NOTE — CONSULT NOTE ADULT - ASSESSMENT
Interventional Radiology  Evaluate for Procedure: Kris    HPI: 66y Male with ANNA    Allergies:   Medications (Abx/Cardiac/Anticoagulation/Blood Products)  buMETAnide Injectable: 2 milliGRAM(s) IV Push (02-24 @ 18:49)  heparin   Injectable: 5000 Unit(s) SubCutaneous (02-25 @ 13:44)    Data:    T(C): 36.5  HR: 70  BP: 143/76  RR: 18  SpO2: 97%    -WBC 10.65 / HgB 12.6 / Hct 39.3 / Plt 240  -Na 136 / Cl 96 / BUN 56 / Glucose 66  -K 4.6 / CO2 27 / Cr 6.82  -ALT -- / Alk Phos -- / T.Bili --    Radiology: n/a    Assessment/Plan:   -66y Male with ANNA, plan for kris tomorrow

## 2021-02-25 NOTE — PROGRESS NOTE ADULT - ASSESSMENT
A/P:   66y Male s/p robotic converted to open LAR, SBR for coloenteric fistula, flexible sigmoidoscopy for sigmoid-small bowel fistula 2/23 complicated by ileus and acute anuric renal failure likely secondary to intra-renal pathology.    Plan:  - Diet:  NPO, NGT  - renal u/s, CT A/P with rectal contrast negative for acute pathology  - f/u nephro recs   - pain control IV Tylenol  - maintain Stringer   - DVT ppx, LVX  - OOB and ambulating as tolerated  - F/u AM labs  - caprini 5    Red Surgery   x9002 A/P:   66y Male s/p robotic converted to open LAR, SBR for coloenteric fistula, flexible sigmoidoscopy for sigmoid-small bowel fistula 2/23 complicated by ileus and acute anuric renal failure likely secondary to intra-renal pathology.    Plan:  - Diet: NGT dced, clear liquids  - renal u/s, CT A/P with rectal contrast negative for acute pathology  - f/u nephro recs   - pain control IV Tylenol  - maintain Stringer   - DVT ppx, LVX  - OOB and ambulating as tolerated  - F/u AM labs  - caprini 5    Red Surgery   x9002

## 2021-02-26 LAB
ANION GAP SERPL CALC-SCNC: 17 MMOL/L — SIGNIFICANT CHANGE UP (ref 5–17)
APPEARANCE UR: ABNORMAL
APTT BLD: 30.3 SEC — SIGNIFICANT CHANGE UP (ref 27.5–35.5)
BACTERIA # UR AUTO: NEGATIVE — SIGNIFICANT CHANGE UP
BILIRUB UR-MCNC: NEGATIVE — SIGNIFICANT CHANGE UP
BLD GP AB SCN SERPL QL: NEGATIVE — SIGNIFICANT CHANGE UP
BUN SERPL-MCNC: 69 MG/DL — HIGH (ref 7–23)
CALCIUM SERPL-MCNC: 8.6 MG/DL — SIGNIFICANT CHANGE UP (ref 8.4–10.5)
CHLORIDE SERPL-SCNC: 96 MMOL/L — SIGNIFICANT CHANGE UP (ref 96–108)
CO2 SERPL-SCNC: 25 MMOL/L — SIGNIFICANT CHANGE UP (ref 22–31)
COLOR SPEC: SIGNIFICANT CHANGE UP
CREAT ?TM UR-MCNC: 121 MG/DL — SIGNIFICANT CHANGE UP
CREAT SERPL-MCNC: 9.23 MG/DL — HIGH (ref 0.5–1.3)
DIFF PNL FLD: ABNORMAL
EPI CELLS # UR: 1 /HPF — SIGNIFICANT CHANGE UP
GLUCOSE SERPL-MCNC: 105 MG/DL — HIGH (ref 70–99)
GLUCOSE UR QL: NEGATIVE — SIGNIFICANT CHANGE UP
HCT VFR BLD CALC: 37.2 % — LOW (ref 39–50)
HGB BLD-MCNC: 12.2 G/DL — LOW (ref 13–17)
HYALINE CASTS # UR AUTO: 1 /LPF — SIGNIFICANT CHANGE UP (ref 0–2)
INR BLD: 1.03 RATIO — SIGNIFICANT CHANGE UP (ref 0.88–1.16)
KETONES UR-MCNC: NEGATIVE — SIGNIFICANT CHANGE UP
LEUKOCYTE ESTERASE UR-ACNC: ABNORMAL
MAGNESIUM SERPL-MCNC: 2.7 MG/DL — HIGH (ref 1.6–2.6)
MCHC RBC-ENTMCNC: 29.4 PG — SIGNIFICANT CHANGE UP (ref 27–34)
MCHC RBC-ENTMCNC: 32.8 GM/DL — SIGNIFICANT CHANGE UP (ref 32–36)
MCV RBC AUTO: 89.6 FL — SIGNIFICANT CHANGE UP (ref 80–100)
NITRITE UR-MCNC: NEGATIVE — SIGNIFICANT CHANGE UP
NRBC # BLD: 0 /100 WBCS — SIGNIFICANT CHANGE UP (ref 0–0)
PH UR: 6 — SIGNIFICANT CHANGE UP (ref 5–8)
PHOSPHATE SERPL-MCNC: 4.2 MG/DL — SIGNIFICANT CHANGE UP (ref 2.5–4.5)
PLATELET # BLD AUTO: 240 K/UL — SIGNIFICANT CHANGE UP (ref 150–400)
POTASSIUM SERPL-MCNC: 4.3 MMOL/L — SIGNIFICANT CHANGE UP (ref 3.5–5.3)
POTASSIUM SERPL-SCNC: 4.3 MMOL/L — SIGNIFICANT CHANGE UP (ref 3.5–5.3)
PROT ?TM UR-MCNC: 48 MG/DL — HIGH (ref 0–12)
PROT UR-MCNC: 100 — SIGNIFICANT CHANGE UP
PROT/CREAT UR-RTO: 0.4 RATIO — HIGH (ref 0–0.2)
PROTHROM AB SERPL-ACNC: 12.1 SEC — SIGNIFICANT CHANGE UP (ref 10.6–13.6)
RBC # BLD: 4.15 M/UL — LOW (ref 4.2–5.8)
RBC # FLD: 13.7 % — SIGNIFICANT CHANGE UP (ref 10.3–14.5)
RBC CASTS # UR COMP ASSIST: 123 /HPF — HIGH (ref 0–4)
RH IG SCN BLD-IMP: POSITIVE — SIGNIFICANT CHANGE UP
SODIUM SERPL-SCNC: 138 MMOL/L — SIGNIFICANT CHANGE UP (ref 135–145)
SODIUM UR-SCNC: <35 MMOL/L — SIGNIFICANT CHANGE UP
SP GR SPEC: 1.02 — SIGNIFICANT CHANGE UP (ref 1.01–1.02)
UROBILINOGEN FLD QL: NEGATIVE — SIGNIFICANT CHANGE UP
WBC # BLD: 10.76 K/UL — HIGH (ref 3.8–10.5)
WBC # FLD AUTO: 10.76 K/UL — HIGH (ref 3.8–10.5)
WBC UR QL: 26 /HPF — HIGH (ref 0–5)

## 2021-02-26 PROCEDURE — 74018 RADEX ABDOMEN 1 VIEW: CPT | Mod: 26

## 2021-02-26 PROCEDURE — 99233 SBSQ HOSP IP/OBS HIGH 50: CPT | Mod: GC

## 2021-02-26 RX ORDER — HYDROMORPHONE HYDROCHLORIDE 2 MG/ML
0.5 INJECTION INTRAMUSCULAR; INTRAVENOUS; SUBCUTANEOUS ONCE
Refills: 0 | Status: DISCONTINUED | OUTPATIENT
Start: 2021-02-26 | End: 2021-02-27

## 2021-02-26 RX ORDER — ACETAMINOPHEN 500 MG
1000 TABLET ORAL EVERY 6 HOURS
Refills: 0 | Status: COMPLETED | OUTPATIENT
Start: 2021-02-26 | End: 2021-02-27

## 2021-02-26 RX ORDER — BUMETANIDE 0.25 MG/ML
2 INJECTION INTRAMUSCULAR; INTRAVENOUS
Refills: 0 | Status: DISCONTINUED | OUTPATIENT
Start: 2021-02-26 | End: 2021-02-27

## 2021-02-26 RX ADMIN — HEPARIN SODIUM 5000 UNIT(S): 5000 INJECTION INTRAVENOUS; SUBCUTANEOUS at 13:38

## 2021-02-26 RX ADMIN — BUMETANIDE 2 MILLIGRAM(S): 0.25 INJECTION INTRAMUSCULAR; INTRAVENOUS at 20:52

## 2021-02-26 RX ADMIN — Medication 3 MILLIGRAM(S): at 20:53

## 2021-02-26 RX ADMIN — HEPARIN SODIUM 5000 UNIT(S): 5000 INJECTION INTRAVENOUS; SUBCUTANEOUS at 20:53

## 2021-02-26 RX ADMIN — SODIUM CHLORIDE 3 MILLILITER(S): 9 INJECTION INTRAMUSCULAR; INTRAVENOUS; SUBCUTANEOUS at 12:35

## 2021-02-26 RX ADMIN — Medication 400 MILLIGRAM(S): at 05:17

## 2021-02-26 RX ADMIN — Medication 400 MILLIGRAM(S): at 13:38

## 2021-02-26 RX ADMIN — Medication 400 MILLIGRAM(S): at 18:50

## 2021-02-26 RX ADMIN — SODIUM CHLORIDE 3 MILLILITER(S): 9 INJECTION INTRAMUSCULAR; INTRAVENOUS; SUBCUTANEOUS at 20:31

## 2021-02-26 RX ADMIN — BENZOCAINE AND MENTHOL 1 LOZENGE: 5; 1 LIQUID ORAL at 13:38

## 2021-02-26 RX ADMIN — SODIUM CHLORIDE 3 MILLILITER(S): 9 INJECTION INTRAMUSCULAR; INTRAVENOUS; SUBCUTANEOUS at 04:37

## 2021-02-26 NOTE — PROGRESS NOTE ADULT - PROBLEM SELECTOR PLAN 1
Pt. with ANNA in the setting of recent surgery and low BP readings. Scr on admission (2/22/21) was 0.80. Scr increased to 2.40 on 2/23/21. Scr has increased further to 9.23 today. Pt. with hemodynamically mediated ANNA/ATN. Pt. with improved urine output. Recommend IV Bumex 2 mg BID. Will need to consider HD if renal failure continues to worsen. Monitor labs and urine output. Avoid potential nephrotoxins. Dose medications as per eGFR.    If any questions, please feel free to contact me  Sree Walls   Nephrology Fellow  [phone]  (After 5 pm or on weekends please page the on-call fellow)

## 2021-02-26 NOTE — PROGRESS NOTE ADULT - SUBJECTIVE AND OBJECTIVE BOX
GENERAL SURGERY PROGRESS NOTE   ___________________________________________________________________    PRASANNA STOMBER | [id number] | 66y Male | [facility] 2MON 214 W1 | LOS 4d    Attending: Dimas Chun    ___________________________________________________________________      SUBJECTIVE:   Patient seen today during morning rounds at bedside and without acute distress. Denies chest pain, fever, severe pain, or SOB.     Diet, NPO (02-26-21 @ 01:57) [Active]          PMH:   History of diverticulitis    Fistula of intestine    H/O colonoscopy    History of tonsillectomy    S/P right inguinal hernia repair          OBJECTIVE:    Allegies:  NKDA    MEDICATIONS  (STANDING):  acetaminophen  IVPB .. 1000 milliGRAM(s) IV Intermittent every 6 hours  benzocaine 15 mG/menthol 3.6 mG (Sugar-Free) Lozenge 1 Lozenge Oral three times a day  heparin   Injectable 5000 Unit(s) SubCutaneous every 8 hours  HYDROmorphone  Injectable 0.5 milliGRAM(s) IV Push once  melatonin 3 milliGRAM(s) Oral at bedtime  sodium chloride 0.9% lock flush 3 milliLiter(s) IV Push every 8 hours    MEDICATIONS  (PRN):      Vitals:  Height (cm): 182.9  T(C): 36.4 (02-26-21 @ 04:35), Max: 37.2 (02-25-21 @ 20:26)  HR: 78 (02-26-21 @ 04:35) (70 - 97)  BP: 152/77 (02-26-21 @ 04:35) (138/82 - 152/77)  RR: 18 (02-26-21 @ 04:35) (18 - 18)  SpO2: 93% (02-26-21 @ 04:35) (93% - 97%)      Physical Exam:   General: NAD, resting comfortably in bed  Pulmonary: breathing comfortably on 2L nasal cannula  Cardiovascular: NSR  Abdominal: softly distended, nontender; port site incisions with steri strips c/d/i  Genitourinary: Stringer in place draining blood tinged urine    Intake&Output:  Totals:    02-24-21 @ 07:01  -  02-25-21 @ 07:00  --------------------------------------------------------  IN: 200 mL / OUT: 700 mL / NET: -500 mL    02-25-21 @ 07:01  -  02-26-21 @ 06:15  --------------------------------------------------------  IN: 340 mL / OUT: 0 mL / NET: 340 mL      Outputs:    02-24-21 @ 07:01  -  02-25-21 @ 07:00  --------------------------------------------------------  OUT:    Nasogastric/Oral tube (mL): 700 mL    Oral Fluid: 0 mL    Voided (mL): 0 mL  Total OUT: 700 mL      02-25-21 @ 07:01  -  02-26-21 @ 06:15  --------------------------------------------------------  OUT:    Voided (mL): 0 mL  Total OUT: 0 mL        Urine Output:    02-24-21 @ 07:01  -  02-25-21 @ 07:00  --------------------------------------------------------  OUT: 0 mL/kg/d    02-25-21 @ 07:01  -  02-26-21 @ 06:15  --------------------------------------------------------  OUT: 0 mL/kg/d        Laboratory:                        12.6   10.65 )-----------( 240      ( 25 Feb 2021 06:57 )             39.3               02-25    136  |  96  |  65<H>  ----------------------------<  109<H>  4.6   |  25  |  8.40<H>    Ca    8.8      25 Feb 2021 20:14  Phos  4.4     02-25  Mg     2.7     02-25            COVID-19 PCR: NotDetec (19 Feb 2021 13:34)  ,   ,   CAPILLARY BLOOD GLUCOSE            Recent Imaging:      Reviewed laboratory and imaging

## 2021-02-26 NOTE — PROVIDER CONTACT NOTE (OTHER) - ACTION/TREATMENT ORDERED:
Bladder scan, LR 500cc Bolus, IVF changed; 2ND LR 1t bolus ordered. CXR. Team will re-eval pt.
MD Kelly Mancuso made aware MD at pt bedside X ray abdomen

## 2021-02-26 NOTE — PROVIDER CONTACT NOTE (OTHER) - ASSESSMENT
pt VSS, pt a and ox4, pt with ch , pt denies SOB, denies chest pain, no n/v
Pt with increasing abd distention. nausea and vomited x1. Zofran ordered and given. OOB and ambulated twice earlier. today.

## 2021-02-26 NOTE — PROVIDER CONTACT NOTE (OTHER) - REASON
pt c/o of abdominal pain, abdominal distention and not being able to pass gas now, requesting to see provider
pt with hematuric low urine output from ch catheter

## 2021-02-26 NOTE — PROVIDER CONTACT NOTE (OTHER) - SITUATION
pt c/o of abdominal pain, abdominal distention and not being able to pass gas now, requesting to see provider
Pt with hematuric, low urine output. Bladder scan showed 18cc in bladder, LR bolus ordered, no improvement in output. Urology contacted and repositioned ch; small amount of urine returned.

## 2021-02-26 NOTE — PROGRESS NOTE ADULT - SUBJECTIVE AND OBJECTIVE BOX
[facility] DIVISION OF KIDNEY DISEASES AND HYPERTENSION -- FOLLOW UP NOTE  --------------------------------------------------------------------------------  HPI: 65-year-old male with no known medical history was admitted to [facility] on 2/22/21 for elective bowel fistula repair. Pt. was planned for a laparascopic surgery but was converted to open surgery due to adhesions. Intraoperatively, pt. noted to have lower BP readings as per anesthesia record. Nephrology team consulted for ANNA. Upon review of labs on [facility], Scr was 0.98 on 1/31/19. Scr on admission was 0.80. Scr increased to 2.40 on 2/23/21. Scr has increased further to 9.23 today.    Pt. evaluated at bedside, sitting in chair. Pt. noted to have 400 mL UOP today.    PAST HISTORY  --------------------------------------------------------------------------------  No significant changes to PMH, PSH, FHx, SHx, unless otherwise noted    ALLERGIES & MEDICATIONS  --------------------------------------------------------------------------------  Allergies    No Known Allergies    Intolerances    Standing Inpatient Medications  acetaminophen  IVPB .. 1000 milliGRAM(s) IV Intermittent every 6 hours  benzocaine 15 mG/menthol 3.6 mG (Sugar-Free) Lozenge 1 Lozenge Oral three times a day  buMETAnide Injectable 2 milliGRAM(s) IV Push two times a day  heparin   Injectable 5000 Unit(s) SubCutaneous every 8 hours  HYDROmorphone  Injectable 0.5 milliGRAM(s) IV Push once  melatonin 3 milliGRAM(s) Oral at bedtime  sodium chloride 0.9% lock flush 3 milliLiter(s) IV Push every 8 hours    REVIEW OF SYSTEMS  --------------------------------------------------------------------------------  Gen: no fatigue  Respiratory: No dyspnea  CV: No chest pain  GI: see HPI  MSK: no LE edema  Neuro: No dizziness  Heme: No bleeding    All other systems were reviewed and are negative, except as noted.    VITALS/PHYSICAL EXAM  --------------------------------------------------------------------------------  T(C): 36.5 (02-26-21 @ 09:45), Max: 37.2 (02-25-21 @ 20:26)  HR: 74 (02-26-21 @ 09:45) (70 - 78)  BP: 138/76 (02-26-21 @ 09:45) (138/76 - 152/77)  RR: 18 (02-26-21 @ 09:45) (18 - 18)  SpO2: 95% (02-26-21 @ 09:45) (93% - 97%)  Wt(kg): --  Height (cm): 182.9 (02-26-21 @ 05:28)    02-25-21 @ 07:01  -  02-26-21 @ 07:00  --------------------------------------------------------  IN: 340 mL / OUT: 0 mL / NET: 340 mL    02-26-21 @ 07:01  -  02-26-21 @ 11:45  --------------------------------------------------------  IN: 200 mL / OUT: 400 mL / NET: -200 mL    Physical Exam:  	Gen: resting, NAD  	HEENT: MMM  	Pulm: clear to auscultation B/L  	CV: S1S2+  	Abd: distended, bandages present              : Stringer catheter with urine present  	Ext: No LE edema B/L  	Neuro: Awake  	Skin: Warm and dry    LABS/STUDIES  --------------------------------------------------------------------------------              12.2   10.76 >-----------<  240      [02-26-21 @ 07:13]              37.2     138  |  96  |  69  ----------------------------<  105      [02-26-21 @ 07:07]  4.3   |  25  |  9.23        Ca     8.6     [02-26-21 @ 07:07]      Mg     2.7     [02-26-21 @ 07:07]      Phos  4.2     [02-26-21 @ 07:07]    Creatinine Trend:  SCr 9.23 [02-26 @ 07:07]  SCr 8.40 [02-25 @ 20:14]  SCr 6.82 [02-25 @ 06:55]  SCr 5.69 [02-24 @ 20:39]  SCr 3.84 [02-24 @ 07:02]       [facility] DIVISION OF KIDNEY DISEASES AND HYPERTENSION -- FOLLOW UP NOTE  --------------------------------------------------------------------------------  HPI: 65-year-old male with no known medical history was admitted to [facility] on 2/22/21 for elective bowel fistula repair. Pt. was planned for a laparascopic surgery but was converted to open surgery due to adhesions. Intraoperatively, pt. noted to have lower BP readings as per anesthesia record. Nephrology team consulted for ANNA. Upon review of labs on [facility], Scr was 0.98 on 1/31/19. Scr on admission was 0.80. Scr increased to 2.40 on 2/23/21. Scr has increased further to 9.23 today.    Pt. evaluated at bedside, sitting in chair. Pt. noted to have 400 mL UOP today. Pt. reports surgical site pain/discomfort.    PAST HISTORY  --------------------------------------------------------------------------------  No significant changes to PMH, PSH, FHx, SHx, unless otherwise noted    ALLERGIES & MEDICATIONS  --------------------------------------------------------------------------------  Allergies    No Known Allergies    Intolerances    Standing Inpatient Medications  acetaminophen  IVPB .. 1000 milliGRAM(s) IV Intermittent every 6 hours  benzocaine 15 mG/menthol 3.6 mG (Sugar-Free) Lozenge 1 Lozenge Oral three times a day  buMETAnide Injectable 2 milliGRAM(s) IV Push two times a day  heparin   Injectable 5000 Unit(s) SubCutaneous every 8 hours  HYDROmorphone  Injectable 0.5 milliGRAM(s) IV Push once  melatonin 3 milliGRAM(s) Oral at bedtime  sodium chloride 0.9% lock flush 3 milliLiter(s) IV Push every 8 hours    REVIEW OF SYSTEMS  --------------------------------------------------------------------------------  Gen: no fatigue  Respiratory: No dyspnea  CV: No chest pain  GI: see HPI  MSK: no LE edema  Neuro: No dizziness  Heme: No bleeding    All other systems were reviewed and are negative, except as noted.    VITALS/PHYSICAL EXAM  --------------------------------------------------------------------------------  T(C): 36.5 (02-26-21 @ 09:45), Max: 37.2 (02-25-21 @ 20:26)  HR: 74 (02-26-21 @ 09:45) (70 - 78)  BP: 138/76 (02-26-21 @ 09:45) (138/76 - 152/77)  RR: 18 (02-26-21 @ 09:45) (18 - 18)  SpO2: 95% (02-26-21 @ 09:45) (93% - 97%)  Wt(kg): --  Height (cm): 182.9 (02-26-21 @ 05:28)    02-25-21 @ 07:01  -  02-26-21 @ 07:00  --------------------------------------------------------  IN: 340 mL / OUT: 0 mL / NET: 340 mL    02-26-21 @ 07:01  -  02-26-21 @ 11:45  --------------------------------------------------------  IN: 200 mL / OUT: 400 mL / NET: -200 mL    Physical Exam:  	Gen: resting, NAD  	HEENT: MMM  	Pulm: clear to auscultation B/L  	CV: S1S2+  	Abd: distended, bandages present              : Stringer catheter with urine present  	Ext: No LE edema B/L  	Neuro: Awake, alert  	Skin: Warm and dry    LABS/STUDIES  --------------------------------------------------------------------------------              12.2   10.76 >-----------<  240      [02-26-21 @ 07:13]              37.2     138  |  96  |  69  ----------------------------<  105      [02-26-21 @ 07:07]  4.3   |  25  |  9.23        Ca     8.6     [02-26-21 @ 07:07]      Mg     2.7     [02-26-21 @ 07:07]      Phos  4.2     [02-26-21 @ 07:07]    Creatinine Trend:  SCr 9.23 [02-26 @ 07:07]  SCr 8.40 [02-25 @ 20:14]  SCr 6.82 [02-25 @ 06:55]  SCr 5.69 [02-24 @ 20:39]  SCr 3.84 [02-24 @ 07:02]

## 2021-02-27 LAB
ANION GAP SERPL CALC-SCNC: 16 MMOL/L — SIGNIFICANT CHANGE UP (ref 5–17)
BUN SERPL-MCNC: 61 MG/DL — HIGH (ref 7–23)
CALCIUM SERPL-MCNC: 9.4 MG/DL — SIGNIFICANT CHANGE UP (ref 8.4–10.5)
CHLORIDE SERPL-SCNC: 99 MMOL/L — SIGNIFICANT CHANGE UP (ref 96–108)
CO2 SERPL-SCNC: 25 MMOL/L — SIGNIFICANT CHANGE UP (ref 22–31)
CREAT SERPL-MCNC: 5.48 MG/DL — HIGH (ref 0.5–1.3)
GLUCOSE SERPL-MCNC: 102 MG/DL — HIGH (ref 70–99)
HCT VFR BLD CALC: 40.3 % — SIGNIFICANT CHANGE UP (ref 39–50)
HGB BLD-MCNC: 13.4 G/DL — SIGNIFICANT CHANGE UP (ref 13–17)
MAGNESIUM SERPL-MCNC: 2.4 MG/DL — SIGNIFICANT CHANGE UP (ref 1.6–2.6)
MCHC RBC-ENTMCNC: 29.5 PG — SIGNIFICANT CHANGE UP (ref 27–34)
MCHC RBC-ENTMCNC: 33.3 GM/DL — SIGNIFICANT CHANGE UP (ref 32–36)
MCV RBC AUTO: 88.6 FL — SIGNIFICANT CHANGE UP (ref 80–100)
NRBC # BLD: 0 /100 WBCS — SIGNIFICANT CHANGE UP (ref 0–0)
PHOSPHATE SERPL-MCNC: 4.3 MG/DL — SIGNIFICANT CHANGE UP (ref 2.5–4.5)
PLATELET # BLD AUTO: 308 K/UL — SIGNIFICANT CHANGE UP (ref 150–400)
POTASSIUM SERPL-MCNC: 3.9 MMOL/L — SIGNIFICANT CHANGE UP (ref 3.5–5.3)
POTASSIUM SERPL-SCNC: 3.9 MMOL/L — SIGNIFICANT CHANGE UP (ref 3.5–5.3)
RBC # BLD: 4.55 M/UL — SIGNIFICANT CHANGE UP (ref 4.2–5.8)
RBC # FLD: 13.7 % — SIGNIFICANT CHANGE UP (ref 10.3–14.5)
SODIUM SERPL-SCNC: 140 MMOL/L — SIGNIFICANT CHANGE UP (ref 135–145)
WBC # BLD: 10.81 K/UL — HIGH (ref 3.8–10.5)
WBC # FLD AUTO: 10.81 K/UL — HIGH (ref 3.8–10.5)

## 2021-02-27 PROCEDURE — 99232 SBSQ HOSP IP/OBS MODERATE 35: CPT | Mod: GC

## 2021-02-27 RX ORDER — BUMETANIDE 0.25 MG/ML
2 INJECTION INTRAMUSCULAR; INTRAVENOUS DAILY
Refills: 0 | Status: DISCONTINUED | OUTPATIENT
Start: 2021-02-27 | End: 2021-02-28

## 2021-02-27 RX ORDER — ACETAMINOPHEN 500 MG
1000 TABLET ORAL EVERY 6 HOURS
Refills: 0 | Status: DISCONTINUED | OUTPATIENT
Start: 2021-02-27 | End: 2021-02-28

## 2021-02-27 RX ADMIN — Medication 400 MILLIGRAM(S): at 00:44

## 2021-02-27 RX ADMIN — HEPARIN SODIUM 5000 UNIT(S): 5000 INJECTION INTRAVENOUS; SUBCUTANEOUS at 05:17

## 2021-02-27 RX ADMIN — HEPARIN SODIUM 5000 UNIT(S): 5000 INJECTION INTRAVENOUS; SUBCUTANEOUS at 13:28

## 2021-02-27 RX ADMIN — BENZOCAINE AND MENTHOL 1 LOZENGE: 5; 1 LIQUID ORAL at 05:16

## 2021-02-27 RX ADMIN — Medication 3 MILLIGRAM(S): at 21:18

## 2021-02-27 RX ADMIN — SODIUM CHLORIDE 3 MILLILITER(S): 9 INJECTION INTRAMUSCULAR; INTRAVENOUS; SUBCUTANEOUS at 12:04

## 2021-02-27 RX ADMIN — SODIUM CHLORIDE 3 MILLILITER(S): 9 INJECTION INTRAMUSCULAR; INTRAVENOUS; SUBCUTANEOUS at 05:28

## 2021-02-27 RX ADMIN — HEPARIN SODIUM 5000 UNIT(S): 5000 INJECTION INTRAVENOUS; SUBCUTANEOUS at 21:18

## 2021-02-27 RX ADMIN — SODIUM CHLORIDE 3 MILLILITER(S): 9 INJECTION INTRAMUSCULAR; INTRAVENOUS; SUBCUTANEOUS at 21:21

## 2021-02-27 RX ADMIN — Medication 1000 MILLIGRAM(S): at 15:43

## 2021-02-27 RX ADMIN — Medication 1000 MILLIGRAM(S): at 21:18

## 2021-02-27 RX ADMIN — BUMETANIDE 2 MILLIGRAM(S): 0.25 INJECTION INTRAMUSCULAR; INTRAVENOUS at 05:16

## 2021-02-27 NOTE — PROGRESS NOTE ADULT - SUBJECTIVE AND OBJECTIVE BOX
Surgery Progress Note    SUBJECTIVE: Pt seen and examined at bedside. No acute events overnight. This AM, patient comfortable and in no-apparent distress. Pain is controlled. Tolerating clear liquids without nausea or vomiting. +gas/+diarrhea. Patient with 1.5L urine output in 24hrs.    Vital Signs Last 24 Hrs  T(C): 36.7 (2021 00:31), Max: 36.9 (2021 17:08)  T(F): 98.1 (2021 00:31), Max: 98.5 (2021 17:08)  HR: 76 (2021 00:31) (70 - 78)  BP: 155/71 (2021 00:31) (134/76 - 155/71)  BP(mean): --  RR: 18 (2021 00:31) (18 - 18)  SpO2: 97% (2021 00:31) (93% - 98%)    Physical Exam:   General: NAD, resting comfortably in bed  Pulmonary: breathing comfortably on 2L nasal cannula  Cardiovascular: NSR  Abdominal: softly distended, nontender; port site incisions with steri strips c/d/i  Genitourinary: Stringer in place draining clear urine    LABS:                        12.2   10.76 )-----------( 240      ( 2021 07:13 )             37.2     02-    138  |  96  |  69<H>  ----------------------------<  105<H>  4.3   |  25  |  9.23<H>    Ca    8.6      2021 07:07  Phos  4.2     02-  Mg     2.7     02-26      PT/INR - ( 2021 09:50 )   PT: 12.1 sec;   INR: 1.03 ratio         PTT - ( 2021 09:50 )  PTT:30.3 sec  Urinalysis Basic - ( 2021 09:57 )    Color: BROWN / Appearance: Slightly Turbid / S.017 / pH: x  Gluc: x / Ketone: Negative  / Bili: Negative / Urobili: Negative   Blood: x / Protein: 100 / Nitrite: Negative   Leuk Esterase: Moderate / RBC: 123 /hpf / WBC 26 /HPF   Sq Epi: x / Non Sq Epi: 1 /hpf / Bacteria: Negative        INs and OUTs:    21 @ 07:01  -  21 @ 07:00  --------------------------------------------------------  IN: 340 mL / OUT: 0 mL / NET: 340 mL    21 @ 07:01  -  21 @ 04:07  --------------------------------------------------------  IN: 800 mL / OUT: 1500 mL / NET: -700 mL

## 2021-02-27 NOTE — PROGRESS NOTE ADULT - SUBJECTIVE AND OBJECTIVE BOX
[facility] DIVISION OF KIDNEY DISEASES AND HYPERTENSION -- FOLLOW UP NOTE  --------------------------------------------------------------------------------  HPI: 65-year-old male with no known medical history was admitted to [facility] on 2/22/21 for elective bowel fistula repair. Pt. was planned for a laparascopic surgery but was converted to open surgery due to adhesions. Intraoperatively, pt. noted to have lower BP readings as per anesthesia record. Nephrology team consulted for ANNA. Upon review of labs on [facility], Scr was 0.98 on 1/31/19. Scr on admission was 0.80. Scr increased to 2.40 on 2/23/21. Scr has increased further to 9.23 yesterday. Today, Scr improved to 5.48.    Pt. evaluated at bedside, sitting in chair. Pt. noted to have 3.7L UOP on IV Bumex.    PAST HISTORY  --------------------------------------------------------------------------------  No significant changes to PMH, PSH, FHx, SHx, unless otherwise noted    ALLERGIES & MEDICATIONS  --------------------------------------------------------------------------------  Allergies    No Known Allergies    Intolerances    Standing Inpatient Medications  buMETAnide Injectable 2 milliGRAM(s) IV Push daily  heparin   Injectable 5000 Unit(s) SubCutaneous every 8 hours  melatonin 3 milliGRAM(s) Oral at bedtime  sodium chloride 0.9% lock flush 3 milliLiter(s) IV Push every 8 hours    REVIEW OF SYSTEMS  --------------------------------------------------------------------------------  Gen: no fever  Respiratory: No dyspnea  CV: No chest pain  GI: No abdominal pain  MSK: no LE edema  Neuro: No dizziness  Heme: No bleeding    All other systems were reviewed and are negative, except as noted.     VITALS/PHYSICAL EXAM  --------------------------------------------------------------------------------  T(C): 36.7 (02-27-21 @ 09:25), Max: 36.9 (02-26-21 @ 17:08)  HR: 69 (02-27-21 @ 09:25) (69 - 76)  BP: 135/87 (02-27-21 @ 09:25) (134/76 - 155/71)  RR: 18 (02-27-21 @ 09:25) (18 - 18)  SpO2: 96% (02-27-21 @ 09:25) (94% - 98%)  Wt(kg): --  Height (cm): 182.9 (02-26-21 @ 05:28)    02-26-21 @ 07:01  -  02-27-21 @ 07:00  --------------------------------------------------------  IN: 1000 mL / OUT: 3750 mL / NET: -2750 mL    02-27-21 @ 07:01  -  02-27-21 @ 13:01  --------------------------------------------------------  IN: 230 mL / OUT: 650 mL / NET: -420 mL    Physical Exam:  	Gen: resting, NAD  	HEENT: MMM  	Pulm: clear to auscultation B/L  	CV: S1S2+  	Abd: distended, bandages present              : Stringer catheter with urine present  	Ext: No LE edema B/L  	Neuro: Awake, alert  	Skin: Warm and dry    LABS/STUDIES  --------------------------------------------------------------------------------              13.4   10.81 >-----------<  308      [02-27-21 @ 08:08]              40.3     140  |  99  |  61  ----------------------------<  102      [02-27-21 @ 08:08]  3.9   |  25  |  5.48        Ca     9.4     [02-27-21 @ 08:08]      Mg     2.4     [02-27-21 @ 08:08]      Phos  4.3     [02-27-21 @ 08:08]    Creatinine Trend:  SCr 5.48 [02-27 @ 08:08]  SCr 9.23 [02-26 @ 07:07]  SCr 8.40 [02-25 @ 20:14]  SCr 6.82 [02-25 @ 06:55]  SCr 5.69 [02-24 @ 20:39]    Urine Creatinine 121      [02-26-21 @ 09:57]  Urine Protein 48      [02-26-21 @ 09:57]  Urine Sodium <35      [02-26-21 @ 09:57]  Urine Osmolality 35      [02-23-21 @ 18:31]    HBsAg Nonreact      [02-25-21 @ 15:47]

## 2021-02-27 NOTE — PROGRESS NOTE ADULT - ASSESSMENT
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    Plan:  - Diet: CLD  - renal u/s, CT A/P with rectal contrast negative for acute pathology  - f/u nephro recs   - pain control IV Tylenol  - maintain Stringer  - DVT ppx, LVX  - OOB and ambulating as tolerated  - F/u AM labs  - caprini 5    Red Surgery   x9002 Assessment  66y Male s/p robotic converted to open LAR, SBR for coloenteric fistula, flexible sigmoidoscopy for sigmoid-small bowel fistula 2/23 complicated by ileus and acute anuric renal failure likely secondary to intra-renal pathology, now resolving s/p Bumex challenge.    Plan:  - Diet: LRD  - renal u/s, CT A/P with rectal contrast negative for acute pathology  - f/u nephro recs; appreciate Bumex now daily, monitor output; if continued improvement to creatinine ok to stop Bumex tomorrow and discharge  - pain control oral medications  - maintain Stringer  - DVT ppx, LVX  - OOB and ambulating as tolerated  - F/u AM labs  - caprini 5    Red Surgery   x9002

## 2021-02-27 NOTE — PROGRESS NOTE ADULT - PROBLEM SELECTOR PLAN 1
Pt. with ANNA in the setting of recent surgery and low BP readings. Scr on admission (2/22/21) was 0.80. Scr increased to 2.40 on 2/23/21. Scr has increased further to 9.23 yesterday. Scr improved to 5.48 today. Pt. with hemodynamically mediated ANNA/ATN, ? in recovery. Pt. with improved urine output. Decrease Bumex to 2 mg IV daily. Monitor labs and urine output. Avoid potential nephrotoxins. Dose medications as per eGFR.    If any questions, please feel free to contact me  Sree Walls   Nephrology Fellow  [phone]  (After 5 pm or on weekends please page the on-call fellow)

## 2021-02-28 ENCOUNTER — TRANSCRIPTION ENCOUNTER (OUTPATIENT)
Age: 66
End: 2021-02-28

## 2021-02-28 VITALS
OXYGEN SATURATION: 95 % | TEMPERATURE: 98 F | DIASTOLIC BLOOD PRESSURE: 70 MMHG | HEART RATE: 72 BPM | RESPIRATION RATE: 18 BRPM | SYSTOLIC BLOOD PRESSURE: 133 MMHG

## 2021-02-28 LAB
ANION GAP SERPL CALC-SCNC: 13 MMOL/L — SIGNIFICANT CHANGE UP (ref 5–17)
BUN SERPL-MCNC: 47 MG/DL — HIGH (ref 7–23)
CALCIUM SERPL-MCNC: 9.3 MG/DL — SIGNIFICANT CHANGE UP (ref 8.4–10.5)
CHLORIDE SERPL-SCNC: 100 MMOL/L — SIGNIFICANT CHANGE UP (ref 96–108)
CO2 SERPL-SCNC: 30 MMOL/L — SIGNIFICANT CHANGE UP (ref 22–31)
CREAT SERPL-MCNC: 2.91 MG/DL — HIGH (ref 0.5–1.3)
GLUCOSE SERPL-MCNC: 99 MG/DL — SIGNIFICANT CHANGE UP (ref 70–99)
HCT VFR BLD CALC: 37.7 % — LOW (ref 39–50)
HGB BLD-MCNC: 12.2 G/DL — LOW (ref 13–17)
MAGNESIUM SERPL-MCNC: 2.2 MG/DL — SIGNIFICANT CHANGE UP (ref 1.6–2.6)
MCHC RBC-ENTMCNC: 29 PG — SIGNIFICANT CHANGE UP (ref 27–34)
MCHC RBC-ENTMCNC: 32.4 GM/DL — SIGNIFICANT CHANGE UP (ref 32–36)
MCV RBC AUTO: 89.8 FL — SIGNIFICANT CHANGE UP (ref 80–100)
NRBC # BLD: 0 /100 WBCS — SIGNIFICANT CHANGE UP (ref 0–0)
PHOSPHATE SERPL-MCNC: 3.7 MG/DL — SIGNIFICANT CHANGE UP (ref 2.5–4.5)
PLATELET # BLD AUTO: 321 K/UL — SIGNIFICANT CHANGE UP (ref 150–400)
POTASSIUM SERPL-MCNC: 3.9 MMOL/L — SIGNIFICANT CHANGE UP (ref 3.5–5.3)
POTASSIUM SERPL-SCNC: 3.9 MMOL/L — SIGNIFICANT CHANGE UP (ref 3.5–5.3)
RBC # BLD: 4.2 M/UL — SIGNIFICANT CHANGE UP (ref 4.2–5.8)
RBC # FLD: 13.8 % — SIGNIFICANT CHANGE UP (ref 10.3–14.5)
SODIUM SERPL-SCNC: 143 MMOL/L — SIGNIFICANT CHANGE UP (ref 135–145)
WBC # BLD: 8.41 K/UL — SIGNIFICANT CHANGE UP (ref 3.8–10.5)
WBC # FLD AUTO: 8.41 K/UL — SIGNIFICANT CHANGE UP (ref 3.8–10.5)

## 2021-02-28 PROCEDURE — 99232 SBSQ HOSP IP/OBS MODERATE 35: CPT

## 2021-02-28 PROCEDURE — 81001 URINALYSIS AUTO W/SCOPE: CPT

## 2021-02-28 PROCEDURE — 85730 THROMBOPLASTIN TIME PARTIAL: CPT

## 2021-02-28 PROCEDURE — C1889: CPT

## 2021-02-28 PROCEDURE — C1769: CPT

## 2021-02-28 PROCEDURE — 84100 ASSAY OF PHOSPHORUS: CPT

## 2021-02-28 PROCEDURE — 85025 COMPLETE CBC W/AUTO DIFF WBC: CPT

## 2021-02-28 PROCEDURE — C1758: CPT

## 2021-02-28 PROCEDURE — 74018 RADEX ABDOMEN 1 VIEW: CPT

## 2021-02-28 PROCEDURE — 83935 ASSAY OF URINE OSMOLALITY: CPT

## 2021-02-28 PROCEDURE — 86900 BLOOD TYPING SEROLOGIC ABO: CPT

## 2021-02-28 PROCEDURE — 88312 SPECIAL STAINS GROUP 1: CPT

## 2021-02-28 PROCEDURE — 85027 COMPLETE CBC AUTOMATED: CPT

## 2021-02-28 PROCEDURE — 83735 ASSAY OF MAGNESIUM: CPT

## 2021-02-28 PROCEDURE — 74176 CT ABD & PELVIS W/O CONTRAST: CPT

## 2021-02-28 PROCEDURE — 86850 RBC ANTIBODY SCREEN: CPT

## 2021-02-28 PROCEDURE — 97161 PT EVAL LOW COMPLEX 20 MIN: CPT

## 2021-02-28 PROCEDURE — S2900: CPT

## 2021-02-28 PROCEDURE — 85610 PROTHROMBIN TIME: CPT

## 2021-02-28 PROCEDURE — 86901 BLOOD TYPING SEROLOGIC RH(D): CPT

## 2021-02-28 PROCEDURE — 82962 GLUCOSE BLOOD TEST: CPT

## 2021-02-28 PROCEDURE — 71045 X-RAY EXAM CHEST 1 VIEW: CPT

## 2021-02-28 PROCEDURE — 80048 BASIC METABOLIC PNL TOTAL CA: CPT

## 2021-02-28 PROCEDURE — 76770 US EXAM ABDO BACK WALL COMP: CPT

## 2021-02-28 PROCEDURE — 82570 ASSAY OF URINE CREATININE: CPT

## 2021-02-28 PROCEDURE — 84300 ASSAY OF URINE SODIUM: CPT

## 2021-02-28 PROCEDURE — 84156 ASSAY OF PROTEIN URINE: CPT

## 2021-02-28 PROCEDURE — 87340 HEPATITIS B SURFACE AG IA: CPT

## 2021-02-28 RX ADMIN — HEPARIN SODIUM 5000 UNIT(S): 5000 INJECTION INTRAVENOUS; SUBCUTANEOUS at 06:00

## 2021-02-28 RX ADMIN — HEPARIN SODIUM 5000 UNIT(S): 5000 INJECTION INTRAVENOUS; SUBCUTANEOUS at 13:20

## 2021-02-28 RX ADMIN — Medication 1000 MILLIGRAM(S): at 06:00

## 2021-02-28 RX ADMIN — Medication 1000 MILLIGRAM(S): at 10:56

## 2021-02-28 RX ADMIN — SODIUM CHLORIDE 3 MILLILITER(S): 9 INJECTION INTRAMUSCULAR; INTRAVENOUS; SUBCUTANEOUS at 13:16

## 2021-02-28 RX ADMIN — BUMETANIDE 2 MILLIGRAM(S): 0.25 INJECTION INTRAMUSCULAR; INTRAVENOUS at 06:00

## 2021-02-28 RX ADMIN — SODIUM CHLORIDE 3 MILLILITER(S): 9 INJECTION INTRAMUSCULAR; INTRAVENOUS; SUBCUTANEOUS at 05:22

## 2021-02-28 RX ADMIN — Medication 1000 MILLIGRAM(S): at 16:29

## 2021-02-28 NOTE — PROGRESS NOTE ADULT - SUBJECTIVE AND OBJECTIVE BOX
GENERAL SURGERY PROGRESS NOTE   ___________________________________________________________________    PRASANNA STOMBER | [id number] | 66y Male | [facility] 2MON [address] W[address] | LOS 6d    Attending: Dimas Chun    ___________________________________________________________________      SUBJECTIVE:   Patient seen today during morning rounds at bedside and without acute distress. Denies chest pain, fever, severe pain, or SOB.     Diet, Low Fiber (02-27-21 @ 10:44) [Active]          PMH:   History of diverticulitis    Fistula of intestine    H/O colonoscopy    History of tonsillectomy    S/P right inguinal hernia repair          OBJECTIVE:    Allegies:  NKDA    MEDICATIONS  (STANDING):  acetaminophen   Tablet .. 1000 milliGRAM(s) Oral every 6 hours  buMETAnide Injectable 2 milliGRAM(s) IV Push daily  heparin   Injectable 5000 Unit(s) SubCutaneous every 8 hours  melatonin 3 milliGRAM(s) Oral at bedtime  sodium chloride 0.9% lock flush 3 milliLiter(s) IV Push every 8 hours    MEDICATIONS  (PRN):      Vitals:    T(C): 36.4 (02-28-21 @ 10:15), Max: 37.2 (02-27-21 @ 16:18)  HR: 65 (02-28-21 @ 10:15) (60 - 81)  BP: 133/77 (02-28-21 @ 10:15) (118/68 - 156/84)  RR: 18 (02-28-21 @ 10:15) (18 - 18)  SpO2: 95% (02-28-21 @ 10:15) (94% - 95%)      Physical Exam:   Constitutional: resting in bed with no acute distress  Neuro: AAOx3  Respiratory:  unlabored breathing, clear respiration  Gastrointestinal: Abdomen soft, non distended, non-tender, surigical site CDI  Extremities:  No edema, no calf tenderness  Skin: Non-jaundiced, no cyanosis or rash observed    Intake&Output:  Totals:    02-27-21 @ 07:01  -  02-28-21 @ 07:00  --------------------------------------------------------  IN: 360 mL / OUT: 2160 mL / NET: -1800 mL    02-28-21 @ 07:01  -  02-28-21 @ 13:32  --------------------------------------------------------  IN: 300 mL / OUT: 600 mL / NET: -300 mL      Outputs:    02-27-21 @ 07:01  -  02-28-21 @ 07:00  --------------------------------------------------------  OUT:    Indwelling Catheter - Urethral (mL): 650 mL    Voided (mL): 1510 mL  Total OUT: 2160 mL      02-28-21 @ 07:01  -  02-28-21 @ 13:32  --------------------------------------------------------  OUT:    Voided (mL): 600 mL  Total OUT: 600 mL        Urine Output:    02-27-21 @ 07:01  -  02-28-21 @ 07:00  --------------------------------------------------------  OUT: 24.94 mL/kg/d    02-28-21 @ 07:01 - 02-28-21 @ 13:32  --------------------------------------------------------  OUT: 6.93 mL/kg/d        Laboratory:                        12.2   8.41  )-----------( 321      ( 28 Feb 2021 07:38 )             37.7               02-28    143  |  100  |  47<H>  ----------------------------<  99  3.9   |  30  |  2.91<H>    Ca    9.3      28 Feb 2021 07:36  Phos  3.7     02-28  Mg     2.2     02-28            COVID-19 PCR: NotDeteallie (19 Feb 2021 13:34)  ,   ,   CAPILLARY BLOOD GLUCOSE            Recent Imaging:      Reviewed laboratory and imaging

## 2021-02-28 NOTE — PROGRESS NOTE ADULT - SUBJECTIVE AND OBJECTIVE BOX
[facility] DIVISION OF KIDNEY DISEASES AND HYPERTENSION -- FOLLOW UP NOTE  --------------------------------------------------------------------------------  HPI: 65-year-old male with no known medical history was admitted to [facility] on 2/22/21 for elective bowel fistula repair. Pt. was planned for a laparascopic surgery but was converted to open surgery due to adhesions. Intraoperatively, pt. noted to have lower BP readings as per anesthesia record. Nephrology team consulted for ANNA. Upon review of labs on [facility], Scr was 0.98 on 1/31/19. Scr on admission was 0.80. Scr increased to 2.40 on 2/23/21. Scr has increased further to 9.23 on 2/26/21. Today, Scr improved 2.91.    Pt. evaluated at bedside, laying comfortably. Pt. passed TOV yesterday.    PAST HISTORY  --------------------------------------------------------------------------------  No significant changes to PMH, PSH, FHx, SHx, unless otherwise noted    ALLERGIES & MEDICATIONS  --------------------------------------------------------------------------------  Allergies    No Known Allergies    Intolerances    Standing Inpatient Medications  acetaminophen   Tablet .. 1000 milliGRAM(s) Oral every 6 hours  buMETAnide Injectable 2 milliGRAM(s) IV Push daily  heparin   Injectable 5000 Unit(s) SubCutaneous every 8 hours  melatonin 3 milliGRAM(s) Oral at bedtime  sodium chloride 0.9% lock flush 3 milliLiter(s) IV Push every 8 hours    REVIEW OF SYSTEMS  --------------------------------------------------------------------------------  Gen: no fever  Respiratory: No dyspnea  CV: No chest pain  GI: No abdominal pain  MSK: no LE edema  Neuro: No dizziness  Heme: No bleeding    All other systems were reviewed and are negative, except as noted.    VITALS/PHYSICAL EXAM  --------------------------------------------------------------------------------  T(C): 36.4 (02-28-21 @ 10:15), Max: 37.2 (02-27-21 @ 16:18)  HR: 65 (02-28-21 @ 10:15) (60 - 81)  BP: 133/77 (02-28-21 @ 10:15) (118/68 - 156/84)  RR: 18 (02-28-21 @ 10:15) (18 - 18)  SpO2: 95% (02-28-21 @ 10:15) (94% - 95%)  Wt(kg): --    02-27-21 @ 07:01  -  02-28-21 @ 07:00  --------------------------------------------------------  IN: 360 mL / OUT: 2160 mL / NET: -1800 mL    02-28-21 @ 07:01  -  02-28-21 @ 10:55  --------------------------------------------------------  IN: 300 mL / OUT: 600 mL / NET: -300 mL    Physical Exam:  	Gen: resting, NAD  	HEENT: MMM  	Pulm: clear to auscultation B/L  	CV: S1S2+  	Abd: distended, bandages present              Ext: No LE edema B/L  	Neuro: Awake, alert  	Skin: Warm and dry    LABS/STUDIES  --------------------------------------------------------------------------------              12.2   8.41  >-----------<  321      [02-28-21 @ 07:38]              37.7     143  |  100  |  47  ----------------------------<  99      [02-28-21 @ 07:36]  3.9   |  30  |  2.91        Ca     9.3     [02-28-21 @ 07:36]      Mg     2.2     [02-28-21 @ 07:36]      Phos  3.7     [02-28-21 @ 07:36]    Creatinine Trend:  SCr 2.91 [02-28 @ 07:36]  SCr 5.48 [02-27 @ 08:08]  SCr 9.23 [02-26 @ 07:07]  SCr 8.40 [02-25 @ 20:14]  SCr 6.82 [02-25 @ 06:55]

## 2021-02-28 NOTE — PROGRESS NOTE ADULT - PROBLEM SELECTOR PROBLEM 1
ANNA (acute kidney injury)

## 2021-02-28 NOTE — DISCHARGE NOTE NURSING/CASE MANAGEMENT/SOCIAL WORK - PATIENT PORTAL LINK FT
You can access the FollowMyHealth Patient Portal offered by [facility] by registering at the following website: [email or web address]. By joining [facility]’s FollowMyHealth portal, you will also be able to view your health information using other applications (apps) compatible with our system.

## 2021-02-28 NOTE — PROGRESS NOTE ADULT - ASSESSMENT
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    Plan:  - Diet: LRD  - renal u/s, CT A/P with rectal contrast negative for acute pathology  - f/u nephro recs; appreciate DC Bumex and follow up with nephrology outpatient in 2-3 weeks  - pain control oral medications  - DVT ppx, LVX  - OOB and ambulating as tolerated  - F/u AM labs  - caprini 5    Red Surgery   [id number]

## 2021-02-28 NOTE — PROGRESS NOTE ADULT - ATTENDING COMMENTS
Pt began making urine early this am, ~ 200cc in the ch bag  C/O abd pain overnight, + flatus, no n/v    aaox3  abd soft, NT/ND, incisions c/d/i    s/p LAR, SB resection  follow cr  monitor u/o closely  electrolytes OK for now  Bumex per renal  clears (was NPO for possible IR for eugene)
pt down in radiology upon my arrival to evaluate but according to the nurse he was not c/o pain  no u/o overnight  pt getting renal u/s and CT with rectal contrast for w/u    u/s without any obstruction  CT without leak    IVF hydration  urology f/u  Renal consult
Pt still with no u/o. He feels a bit "loopy" but denies abd pain. + flatus    aaox3  abd soft, NT/ND, incisions c/d/i  Cr > 6    CT with no evidence of leak    NGT d/sho, start clears  unclear etiology for such abrupt and drastic renal failure  f/u renal recs, may need dialysis, lytes OK for now, vitals normal
Pt continues to improve. Still having bowel function and tolerating CLD. >3L of UOP yesterday.  Cr continuing to improve    abd soft, non-distended, minimally tender to palpation  ch in place w/ clear yellow urine    - advance to LRD  - discuss w/ nephrology regarding timing of d/c for ch, plans for bumex and possible d/c tomorrow if Cr still improving (vs if they want it to normalise first)  - continue to encourage ambulation  - possible d/c home tomorrow    Mi Brown MD  Attending Physician  Colorectal Surgery
I have seen this patient with the fellow and agree with their assessment and plan.     Jennifer Newsome MD  Cell   Pager   Office 
I have seen this patient with the fellow and agree with their assessment and plan. In addition, as per above, needs go home on no diuretics  needs f.u with Dr Nilay Waller at [address] [place] [address] in 2-3 weeks  Pt should see PMD sooner    Jennifer Newsome MD  Cell   Pager   Office

## 2021-02-28 NOTE — PROGRESS NOTE ADULT - PROVIDER SPECIALTY LIST ADULT
Anesthesia
Nephrology
Surgery
Urology
Urology
Surgery
Anesthesia
Nephrology
Surgery
Surgery
Nephrology
Nephrology

## 2021-02-28 NOTE — PROGRESS NOTE ADULT - PROBLEM SELECTOR PLAN 1
Pt. with ANNA in the setting of recent surgery and low BP readings. Scr on admission (2/22/21) was 0.80. Scr increased to 2.40 on 2/23/21. Scr has increased further to 9.23 on 2/26/21. Scr improved to 2.91 today. Pt. with hemodynamically mediated ANNA/ATN, in recovery. Pt. with improved urine output. Discontinue IV Bumex. Avoid potential nephrotoxins. Dose medications as per eGFR.    If pt. for discharge, pt. can follow up with Dr. Waller (Nephrology) at [address] [place] [address] ([phone]) in 3 weeks.  If any questions, please feel free to contact me  Sree Walls   Nephrology Fellow  [phone]  (After 5 pm or on weekends please page the on-call fellow)

## 2021-03-01 LAB — SURGICAL PATHOLOGY STUDY: SIGNIFICANT CHANGE UP

## 2021-03-03 ENCOUNTER — NON-APPOINTMENT (OUTPATIENT)
Age: 66
End: 2021-03-03

## 2021-03-04 ENCOUNTER — APPOINTMENT (OUTPATIENT)
Dept: NEPHROLOGY | Facility: CLINIC | Age: 66
End: 2021-03-04
Payer: COMMERCIAL

## 2021-03-04 VITALS
SYSTOLIC BLOOD PRESSURE: 144 MMHG | HEIGHT: 72 IN | RESPIRATION RATE: 15 BRPM | WEIGHT: 186 LBS | TEMPERATURE: 97.9 F | BODY MASS INDEX: 25.19 KG/M2 | HEART RATE: 66 BPM | OXYGEN SATURATION: 97 % | DIASTOLIC BLOOD PRESSURE: 76 MMHG

## 2021-03-04 DIAGNOSIS — N17.9 ACUTE KIDNEY FAILURE, UNSPECIFIED: ICD-10-CM

## 2021-03-04 LAB
ALBUMIN SERPL ELPH-MCNC: 4 G/DL
ANION GAP SERPL CALC-SCNC: 10 MMOL/L
BUN SERPL-MCNC: 25 MG/DL
CALCIUM SERPL-MCNC: 9.3 MG/DL
CHLORIDE SERPL-SCNC: 103 MMOL/L
CO2 SERPL-SCNC: 28 MMOL/L
CREAT SERPL-MCNC: 1.07 MG/DL
GLUCOSE SERPL-MCNC: 97 MG/DL
PHOSPHATE SERPL-MCNC: 3.1 MG/DL
POTASSIUM SERPL-SCNC: 4.5 MMOL/L
SODIUM SERPL-SCNC: 141 MMOL/L

## 2021-03-04 PROCEDURE — 99072 ADDL SUPL MATRL&STAF TM PHE: CPT

## 2021-03-04 PROCEDURE — 99214 OFFICE O/P EST MOD 30 MIN: CPT

## 2021-03-04 RX ORDER — NEOMYCIN SULFATE 500 MG/1
500 TABLET ORAL
Qty: 3 | Refills: 0 | Status: DISCONTINUED | COMMUNITY
Start: 2021-01-25 | End: 2021-03-04

## 2021-03-04 RX ORDER — METRONIDAZOLE 500 MG/1
500 TABLET ORAL
Qty: 3 | Refills: 0 | Status: DISCONTINUED | COMMUNITY
Start: 2021-01-25 | End: 2021-03-04

## 2021-03-04 NOTE — REVIEW OF SYSTEMS
[As Noted in HPI] : as noted in HPI [Negative] : Heme/Lymph [Fever] : no fever [Eye Pain] : no eye pain [Earache] : no earache [Chest Pain] : no chest pain [Abdominal Pain] : no abdominal pain [Dysuria] : no dysuria [Limb Swelling] : no limb swelling [Dizziness] : no dizziness [FreeTextEntry8] : o

## 2021-03-04 NOTE — ASSESSMENT
[FreeTextEntry1] : 1. ANNA: Pt. with ANNA during recent hospital stay (2/22/21-2/28/21) at [facility] for elective bowel fistula repair. Pt. was planned for a robotic laparoscopic surgery on 2/22/21. However, due to adhesions, pt. underwent open surgery. Intraoperatively, pt. noted to have lower BP readings as per anesthesia record. Post-operatively, pt. developed ANNA. Scr on admission to [facility] was 0.80. Scr increased to 2.40 on 2/23/21. Scr increased further to 9.23 on 2/26/21. Scr however improved to 2.91 on day of discharge (2/28/21). Pt. with resolving ANNA. Scr decreased further to 1.07 on labs done during office visit today. Labs results reviewed with patient over the phone. Pt. advised to monitor renal function in PCP's office. Avoid any potential nephrotoxins. RTC as needed.

## 2021-03-04 NOTE — PHYSICAL EXAM
[General Appearance - Alert] : alert [General Appearance - In No Acute Distress] : in no acute distress [Sclera] : the sclera and conjunctiva were normal [Outer Ear] : the ears and nose were normal in appearance [Neck Appearance] : the appearance of the neck was normal [Jugular Venous Distention Increased] : there was no jugular-venous distention [Respiration, Rhythm And Depth] : normal respiratory rhythm and effort [Auscultation Breath Sounds / Voice Sounds] : lungs were clear to auscultation bilaterally [Heart Rate And Rhythm] : heart rate was normal and rhythm regular [Heart Sounds] : normal S1 and S2 [Edema] : there was no peripheral edema [Abdomen Soft] : soft [Abdomen Tenderness] : non-tender [No CVA Tenderness] : no ~M costovertebral angle tenderness [Nail Clubbing] : no clubbing  or cyanosis of the fingernails [] : no rash [Oriented To Time, Place, And Person] : oriented to person, place, and time [Affect] : the affect was normal [Mood] : the mood was normal [FreeTextEntry1] : surgical scars/staples seen

## 2021-03-04 NOTE — HISTORY OF PRESENT ILLNESS
[FreeTextEntry1] : 65-year-old male with history of diverticulitis was recently hospitalized at [facility] (2/22/21-2/28/21) for elective bowel fistula repair. Pt. was planned for a robotic laparoscopic surgery on 2/22/21. However, due to adhesions, pt. underwent open surgery. Intraoperatively, pt. noted to have lower BP readings as per anesthesia record. Post-operatively, pt. developed ANNA hence inpatient nephrology team was consulted. Scr on admission to [facility] was 0.80. Scr increased to 2.40 on 2/23/21. Scr increased further to 9.23 on 2/26/21. Scr however improved to 2.91 on day of discharge (2/28/21).\par \par Pt. currently feels better and denied fever, CP, SOB, HA, LE edema or dizziness. Pt. reports better oral intake.

## 2021-03-10 ENCOUNTER — APPOINTMENT (OUTPATIENT)
Dept: COLORECTAL SURGERY | Facility: CLINIC | Age: 66
End: 2021-03-10
Payer: COMMERCIAL

## 2021-03-10 VITALS
OXYGEN SATURATION: 100 % | RESPIRATION RATE: 15 BRPM | SYSTOLIC BLOOD PRESSURE: 131 MMHG | DIASTOLIC BLOOD PRESSURE: 71 MMHG | TEMPERATURE: 97.8 F | HEART RATE: 61 BPM

## 2021-03-10 PROCEDURE — 99024 POSTOP FOLLOW-UP VISIT: CPT

## 2021-03-10 NOTE — HISTORY OF PRESENT ILLNESS
[FreeTextEntry1] : The patient feels well without any abd pain. He is having normal soft Bms daily and is having good urine output. He was seen by renal and now has a normal Cr. \par He is eating well without n/v

## 2021-03-10 NOTE — ASSESSMENT
[FreeTextEntry1] : Resume high fiber diet\par adequate water intake\par may resume light exercise after 4 weeks\par f/u in 1 month with one of my partners for a wound check\par f/u with renal PRN\par f/u with Dr. Louis already scheduled.

## 2021-03-10 NOTE — PHYSICAL EXAM
[Exam Deferred] : exam was deferred [Normal Breath Sounds] : Normal breath sounds [Normal Heart Sounds] : normal heart sounds [Normal Rate and Rhythm] : normal rate and rhythm [Alert] : alert [Oriented to Person] : oriented to person [Oriented to Place] : oriented to place [Oriented to Time] : oriented to time [Calm] : calm [de-identified] : soft, +BS, NT/ND, incisions c/d/i with staples that are removed today [de-identified] : well appearing [de-identified] : NC/AT [de-identified] : +ROM/ROLANDO [de-identified] : intact

## 2021-03-31 ENCOUNTER — APPOINTMENT (OUTPATIENT)
Dept: GASTROENTEROLOGY | Facility: CLINIC | Age: 66
End: 2021-03-31

## 2021-03-31 ENCOUNTER — APPOINTMENT (OUTPATIENT)
Dept: COLORECTAL SURGERY | Facility: CLINIC | Age: 66
End: 2021-03-31
Payer: COMMERCIAL

## 2021-03-31 DIAGNOSIS — K57.32 DIVERTICULITIS OF LARGE INTESTINE W/OUT PERFORATION OR ABSCESS W/OUT BLEEDING: ICD-10-CM

## 2021-03-31 DIAGNOSIS — N32.1 VESICOINTESTINAL FISTULA: ICD-10-CM

## 2021-03-31 DIAGNOSIS — K57.30 DIVERTICULOSIS OF LARGE INTESTINE W/OUT PERFORATION OR ABSCESS W/OUT BLEEDING: ICD-10-CM

## 2021-03-31 DIAGNOSIS — K63.2 FISTULA OF INTESTINE: ICD-10-CM

## 2021-03-31 PROCEDURE — 99024 POSTOP FOLLOW-UP VISIT: CPT

## 2021-03-31 RX ORDER — OXYCODONE 5 MG/1
5 TABLET ORAL
Qty: 20 | Refills: 0 | Status: DISCONTINUED | COMMUNITY
Start: 2021-03-03 | End: 2021-03-31

## 2021-03-31 RX ORDER — CIPROFLOXACIN HYDROCHLORIDE 500 MG/1
500 TABLET, FILM COATED ORAL
Qty: 28 | Refills: 0 | Status: DISCONTINUED | COMMUNITY
Start: 2021-03-31 | End: 2021-03-31

## 2021-03-31 RX ORDER — AMOXICILLIN AND CLAVULANATE POTASSIUM 875; 125 MG/1; MG/1
875-125 TABLET, COATED ORAL
Qty: 20 | Refills: 0 | Status: DISCONTINUED | COMMUNITY
Start: 2021-03-31 | End: 2021-03-31

## 2021-03-31 NOTE — ASSESSMENT
[FreeTextEntry1] : 66 -year-old male status post open anterior resection for diverticulitis with colovesical fistula now with mild surrounding cellulitis of the umbilicus.

## 2021-03-31 NOTE — PHYSICAL EXAM
[de-identified] : Mild surrounding cellulitis around the umbilicus. Wound probed no purulent drainage

## 2021-03-31 NOTE — HISTORY OF PRESENT ILLNESS
[FreeTextEntry1] :  58-year-old male status post anterior resection for diverticulitis with colovesical fistula presents for followup with painful drainage from periumbilical incision

## 2021-04-07 ENCOUNTER — APPOINTMENT (OUTPATIENT)
Dept: COLORECTAL SURGERY | Facility: CLINIC | Age: 66
End: 2021-04-07
Payer: COMMERCIAL

## 2021-04-07 VITALS — TEMPERATURE: 98 F

## 2021-04-07 PROCEDURE — 99024 POSTOP FOLLOW-UP VISIT: CPT

## 2021-04-07 NOTE — ASSESSMENT
[FreeTextEntry1] : 66-year-old male with superficial cellulitis of wound postoperatively now resolved.

## 2021-05-14 ENCOUNTER — APPOINTMENT (OUTPATIENT)
Dept: DERMATOLOGY | Facility: CLINIC | Age: 66
End: 2021-05-14
Payer: COMMERCIAL

## 2021-05-14 DIAGNOSIS — L11.1 TRANSIENT ACANTHOLYTIC DERMATOSIS [GROVER]: ICD-10-CM

## 2021-05-14 DIAGNOSIS — B35.6 TINEA CRURIS: ICD-10-CM

## 2021-05-14 DIAGNOSIS — L85.3 XEROSIS CUTIS: ICD-10-CM

## 2021-05-14 DIAGNOSIS — B35.1 TINEA UNGUIUM: ICD-10-CM

## 2021-05-14 PROCEDURE — 99072 ADDL SUPL MATRL&STAF TM PHE: CPT

## 2021-05-14 PROCEDURE — 99214 OFFICE O/P EST MOD 30 MIN: CPT | Mod: 25

## 2021-05-14 PROCEDURE — 17003 DESTRUCT PREMALG LES 2-14: CPT | Mod: 59

## 2021-05-14 PROCEDURE — 17000 DESTRUCT PREMALG LESION: CPT | Mod: 59

## 2021-05-14 RX ORDER — NYSTATIN AND TRIAMCINOLONE ACETONIDE 100000; 1 MG/G; MG/G
100000-0.1 CREAM TOPICAL TWICE DAILY
Qty: 15 | Refills: 3 | Status: DISCONTINUED | COMMUNITY
Start: 2021-01-19 | End: 2021-05-14

## 2021-05-14 RX ORDER — KETOCONAZOLE 20 MG/G
2 CREAM TOPICAL
Qty: 1 | Refills: 5 | Status: ACTIVE | COMMUNITY
Start: 2021-05-14 | End: 1900-01-01

## 2021-05-25 ENCOUNTER — APPOINTMENT (OUTPATIENT)
Dept: INTERNAL MEDICINE | Facility: CLINIC | Age: 66
End: 2021-05-25
Payer: COMMERCIAL

## 2021-05-25 ENCOUNTER — NON-APPOINTMENT (OUTPATIENT)
Age: 66
End: 2021-05-25

## 2021-05-25 ENCOUNTER — APPOINTMENT (OUTPATIENT)
Dept: GASTROENTEROLOGY | Facility: CLINIC | Age: 66
End: 2021-05-25

## 2021-05-25 VITALS
WEIGHT: 200 LBS | HEART RATE: 53 BPM | OXYGEN SATURATION: 99 % | DIASTOLIC BLOOD PRESSURE: 77 MMHG | SYSTOLIC BLOOD PRESSURE: 131 MMHG | TEMPERATURE: 97.9 F | BODY MASS INDEX: 27.09 KG/M2 | HEIGHT: 72 IN

## 2021-05-25 DIAGNOSIS — Z23 ENCOUNTER FOR IMMUNIZATION: ICD-10-CM

## 2021-05-25 PROCEDURE — 36415 COLL VENOUS BLD VENIPUNCTURE: CPT

## 2021-05-25 PROCEDURE — 99072 ADDL SUPL MATRL&STAF TM PHE: CPT

## 2021-05-25 PROCEDURE — 93000 ELECTROCARDIOGRAM COMPLETE: CPT

## 2021-05-25 PROCEDURE — 99387 INIT PM E/M NEW PAT 65+ YRS: CPT | Mod: 25

## 2021-05-25 RX ORDER — ZOSTER VACCINE RECOMBINANT, ADJUVANTED 50 MCG/0.5
50 KIT INTRAMUSCULAR
Qty: 1 | Refills: 1 | Status: ACTIVE | COMMUNITY
Start: 2021-05-25 | End: 1900-01-01

## 2021-05-25 RX ORDER — CEPHALEXIN 500 MG/1
500 CAPSULE ORAL EVERY 8 HOURS
Qty: 21 | Refills: 0 | Status: DISCONTINUED | COMMUNITY
Start: 2021-03-31 | End: 2021-05-25

## 2021-05-27 LAB
25(OH)D3 SERPL-MCNC: 30.7 NG/ML
ALBUMIN SERPL ELPH-MCNC: 4.4 G/DL
ALP BLD-CCNC: 58 U/L
ALT SERPL-CCNC: 5 U/L
ANION GAP SERPL CALC-SCNC: 11 MMOL/L
AST SERPL-CCNC: 13 U/L
BASOPHILS # BLD AUTO: 0.03 K/UL
BASOPHILS NFR BLD AUTO: 0.5 %
BILIRUB SERPL-MCNC: 0.4 MG/DL
BUN SERPL-MCNC: 26 MG/DL
CALCIUM SERPL-MCNC: 9.4 MG/DL
CHLORIDE SERPL-SCNC: 102 MMOL/L
CHOLEST SERPL-MCNC: 174 MG/DL
CO2 SERPL-SCNC: 28 MMOL/L
CREAT SERPL-MCNC: 1.11 MG/DL
CRP SERPL-MCNC: <3 MG/L
EOSINOPHIL # BLD AUTO: 0.15 K/UL
EOSINOPHIL NFR BLD AUTO: 2.7 %
ESTIMATED AVERAGE GLUCOSE: 97 MG/DL
GLUCOSE SERPL-MCNC: 92 MG/DL
HBA1C MFR BLD HPLC: 5 %
HCT VFR BLD CALC: 45.9 %
HDLC SERPL-MCNC: 74 MG/DL
HGB BLD-MCNC: 14.2 G/DL
IMM GRANULOCYTES NFR BLD AUTO: 0.4 %
LDLC SERPL CALC-MCNC: 85 MG/DL
LYMPHOCYTES # BLD AUTO: 1.92 K/UL
LYMPHOCYTES NFR BLD AUTO: 34.3 %
MAN DIFF?: NORMAL
MCHC RBC-ENTMCNC: 29.7 PG
MCHC RBC-ENTMCNC: 30.9 GM/DL
MCV RBC AUTO: 96 FL
MONOCYTES # BLD AUTO: 0.54 K/UL
MONOCYTES NFR BLD AUTO: 9.6 %
NEUTROPHILS # BLD AUTO: 2.94 K/UL
NEUTROPHILS NFR BLD AUTO: 52.5 %
NONHDLC SERPL-MCNC: 100 MG/DL
PLATELET # BLD AUTO: 307 K/UL
POTASSIUM SERPL-SCNC: 4.2 MMOL/L
PROT SERPL-MCNC: 7 G/DL
PSA SERPL-MCNC: 0.57 NG/ML
RBC # BLD: 4.78 M/UL
RBC # FLD: 13.6 %
SODIUM SERPL-SCNC: 140 MMOL/L
TRIGL SERPL-MCNC: 75 MG/DL
TSH SERPL-ACNC: 3.31 UIU/ML
WBC # FLD AUTO: 5.6 K/UL

## 2021-06-23 ENCOUNTER — APPOINTMENT (OUTPATIENT)
Dept: COLORECTAL SURGERY | Facility: CLINIC | Age: 66
End: 2021-06-23
Payer: COMMERCIAL

## 2021-06-23 ENCOUNTER — APPOINTMENT (OUTPATIENT)
Dept: DERMATOLOGY | Facility: CLINIC | Age: 66
End: 2021-06-23
Payer: COMMERCIAL

## 2021-06-23 VITALS — HEIGHT: 72 IN | WEIGHT: 195 LBS | BODY MASS INDEX: 26.41 KG/M2

## 2021-06-23 DIAGNOSIS — T14.8XXA OTHER INJURY OF UNSPECIFIED BODY REGION, INITIAL ENCOUNTER: ICD-10-CM

## 2021-06-23 DIAGNOSIS — L08.9 OTHER INJURY OF UNSPECIFIED BODY REGION, INITIAL ENCOUNTER: ICD-10-CM

## 2021-06-23 DIAGNOSIS — T81.30XA DISRUPTION OF WOUND, UNSPECIFIED, INITIAL ENCOUNTER: ICD-10-CM

## 2021-06-23 PROCEDURE — 99213 OFFICE O/P EST LOW 20 MIN: CPT

## 2021-06-23 PROCEDURE — 17000 DESTRUCT PREMALG LESION: CPT | Mod: 59

## 2021-06-23 PROCEDURE — 99072 ADDL SUPL MATRL&STAF TM PHE: CPT

## 2021-06-23 PROCEDURE — 17003 DESTRUCT PREMALG LES 2-14: CPT | Mod: 59

## 2021-06-23 PROCEDURE — 99213 OFFICE O/P EST LOW 20 MIN: CPT | Mod: 25

## 2021-06-23 RX ORDER — MUPIROCIN 20 MG/G
2 OINTMENT TOPICAL
Qty: 1 | Refills: 1 | Status: ACTIVE | COMMUNITY
Start: 2021-06-23 | End: 1900-01-01

## 2021-06-23 NOTE — ASSESSMENT
[FreeTextEntry1] : no active infection\par healing well\par f/u in 2-3 weeks if not completely closed
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
## 2021-06-23 NOTE — PHYSICAL EXAM
[Exam Deferred] : exam was deferred [Normal Breath Sounds] : Normal breath sounds [Normal Heart Sounds] : normal heart sounds [Normal Rate and Rhythm] : normal rate and rhythm [Alert] : alert [Oriented to Person] : oriented to person [Oriented to Place] : oriented to place [Oriented to Time] : oriented to time [Calm] : calm [de-identified] : soft, +BS, NT/ND, lap incisions well healed, midline well healed with reducible, nontender hernia at umbilicus, ~3mm area at superior portion with dry scabbed area, no cellulitis [de-identified] : well appearing [de-identified] : NC/AT [de-identified] : +ROM/ROLANDO [de-identified] : intact

## 2021-07-26 ENCOUNTER — APPOINTMENT (OUTPATIENT)
Dept: SURGERY | Facility: CLINIC | Age: 66
End: 2021-07-26
Payer: COMMERCIAL

## 2021-07-26 ENCOUNTER — NON-APPOINTMENT (OUTPATIENT)
Age: 66
End: 2021-07-26

## 2021-07-26 VITALS
DIASTOLIC BLOOD PRESSURE: 80 MMHG | WEIGHT: 195 LBS | HEART RATE: 64 BPM | HEIGHT: 72 IN | TEMPERATURE: 98.3 F | OXYGEN SATURATION: 96 % | SYSTOLIC BLOOD PRESSURE: 166 MMHG | BODY MASS INDEX: 26.41 KG/M2

## 2021-07-26 PROCEDURE — 99204 OFFICE O/P NEW MOD 45 MIN: CPT

## 2021-07-26 PROCEDURE — 99072 ADDL SUPL MATRL&STAF TM PHE: CPT

## 2021-07-26 NOTE — HISTORY OF PRESENT ILLNESS
[de-identified] : 67 yo male who is s/p sigmoidectomy for diverticulitis presents to the office for evaluation of ventral hernia. He states that he has been having some discomfort over the hernia site.  He denied any obstructive symptoms associated with the hernia.

## 2021-07-26 NOTE — PHYSICAL EXAM
[de-identified] : well appearing [de-identified] : reducible ventral hernia, diastasis recti in the upper abdomen

## 2021-07-26 NOTE — ASSESSMENT
[FreeTextEntry1] : 67 yo male with ventral hernia\par Plan: Laparoscopic Robotic Ventral Hernia Repair with mesh. All risk, benefit, alternatives were explained to the patient and the patient expressed full understanding.

## 2021-09-10 ENCOUNTER — OUTPATIENT (OUTPATIENT)
Dept: OUTPATIENT SERVICES | Facility: HOSPITAL | Age: 66
LOS: 1 days | Discharge: HOME | End: 2021-09-10
Payer: MEDICARE

## 2021-09-10 VITALS
RESPIRATION RATE: 16 BRPM | DIASTOLIC BLOOD PRESSURE: 81 MMHG | SYSTOLIC BLOOD PRESSURE: 139 MMHG | OXYGEN SATURATION: 97 % | HEART RATE: 64 BPM | TEMPERATURE: 99 F | WEIGHT: 209.66 LBS | HEIGHT: 72 IN

## 2021-09-10 DIAGNOSIS — K43.9 VENTRAL HERNIA WITHOUT OBSTRUCTION OR GANGRENE: ICD-10-CM

## 2021-09-10 DIAGNOSIS — Z98.890 OTHER SPECIFIED POSTPROCEDURAL STATES: Chronic | ICD-10-CM

## 2021-09-10 DIAGNOSIS — Z90.89 ACQUIRED ABSENCE OF OTHER ORGANS: Chronic | ICD-10-CM

## 2021-09-10 DIAGNOSIS — Z01.818 ENCOUNTER FOR OTHER PREPROCEDURAL EXAMINATION: ICD-10-CM

## 2021-09-10 DIAGNOSIS — Z90.49 ACQUIRED ABSENCE OF OTHER SPECIFIED PARTS OF DIGESTIVE TRACT: Chronic | ICD-10-CM

## 2021-09-10 LAB
ANION GAP SERPL CALC-SCNC: 5 MMOL/L — SIGNIFICANT CHANGE UP (ref 5–17)
BUN SERPL-MCNC: 29 MG/DL — HIGH (ref 7–23)
CALCIUM SERPL-MCNC: 9.2 MG/DL — SIGNIFICANT CHANGE UP (ref 8.5–10.1)
CHLORIDE SERPL-SCNC: 104 MMOL/L — SIGNIFICANT CHANGE UP (ref 96–108)
CO2 SERPL-SCNC: 29 MMOL/L — SIGNIFICANT CHANGE UP (ref 22–31)
CREAT SERPL-MCNC: 1.08 MG/DL — SIGNIFICANT CHANGE UP (ref 0.5–1.3)
GLUCOSE SERPL-MCNC: 98 MG/DL — SIGNIFICANT CHANGE UP (ref 70–99)
HCT VFR BLD CALC: 46.3 % — SIGNIFICANT CHANGE UP (ref 39–50)
HGB BLD-MCNC: 15.4 G/DL — SIGNIFICANT CHANGE UP (ref 13–17)
MCHC RBC-ENTMCNC: 30.1 PG — SIGNIFICANT CHANGE UP (ref 27–34)
MCHC RBC-ENTMCNC: 33.3 GM/DL — SIGNIFICANT CHANGE UP (ref 32–36)
MCV RBC AUTO: 90.6 FL — SIGNIFICANT CHANGE UP (ref 80–100)
NRBC # BLD: 0 /100 WBCS — SIGNIFICANT CHANGE UP (ref 0–0)
PLATELET # BLD AUTO: 241 K/UL — SIGNIFICANT CHANGE UP (ref 150–400)
POTASSIUM SERPL-MCNC: 3.8 MMOL/L — SIGNIFICANT CHANGE UP (ref 3.5–5.3)
POTASSIUM SERPL-SCNC: 3.8 MMOL/L — SIGNIFICANT CHANGE UP (ref 3.5–5.3)
RBC # BLD: 5.11 M/UL — SIGNIFICANT CHANGE UP (ref 4.2–5.8)
RBC # FLD: 13.7 % — SIGNIFICANT CHANGE UP (ref 10.3–14.5)
SODIUM SERPL-SCNC: 138 MMOL/L — SIGNIFICANT CHANGE UP (ref 135–145)
WBC # BLD: 5.57 K/UL — SIGNIFICANT CHANGE UP (ref 3.8–10.5)
WBC # FLD AUTO: 5.57 K/UL — SIGNIFICANT CHANGE UP (ref 3.8–10.5)

## 2021-09-10 PROCEDURE — 93010 ELECTROCARDIOGRAM REPORT: CPT

## 2021-09-10 RX ORDER — MULTIVIT-MIN/FERROUS GLUCONATE 9 MG/15 ML
1 LIQUID (ML) ORAL
Qty: 0 | Refills: 0 | DISCHARGE

## 2021-09-10 NOTE — H&P PST ADULT - HISTORY OF PRESENT ILLNESS
.....64 y/o male, with no significant medical history, c/o abdominal pain, fever and was referred to Dr. Francisco. Patient was given multiple antibiotics and complained of watery BMs & fecal incontinence requiring diapers. Patient reports diagnosed  +Cdiff. Colonoscopy done 8/2020 revealed diverticulitis. 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 Chun. Presents to [facility] for a scheduled ERAS, robotic laparoscopic anterior resection, small bowel resection on 2/22/2021.    *Covid PCR scheduled 2/19 at [facility] 66M pmh diverticulitis s/p colon resection 2/2021 found to have ventral hernia here for PST for scheduled robotic assisted laparoscopic ventral hernia repair with mesh  this patient denies any fever, cough, sob, flu like symptoms or travel outside of the [place] in the past 30 days

## 2021-09-10 NOTE — H&P PST ADULT - PROBLEM SELECTOR PLAN 1
robotic assisted laparoscopic ventral hernia repair with mesh  Pre-op instructions given by PA, patient verbalized understanding  Chlorhexidine wash instructions given

## 2021-09-10 NOTE — H&P PST ADULT - NSICDXPASTSURGICALHX_GEN_ALL_CORE_FT
PAST SURGICAL HISTORY:  H/O colonoscopy 8/2020    History of tonsillectomy childhood    S/P right inguinal hernia repair 2012

## 2021-09-10 NOTE — H&P PST ADULT - ASSESSMENT
66M [facility] diverticulitis s/p colon resection 2021 found to have ventral hernia here for PST for scheduled robotic assisted laparoscopic ventral hernia repair with mesh  CAPRINI SCORE    AGE RELATED RISK FACTORS                                                       MOBILITY RELATED FACTORS  [ ] Age 41-60 years                                            (1 Point)                  [ ] Bed rest                                                        (1 Point)  [x ] Age: 61-74 years                                           (2 Points)                [ ] Plaster cast                                                   (2 Points)  [ ] Age= 75 years                                              (3 Points)                 [ ] Bed bound for more than 72 hours                   (2 Points)    DISEASE RELATED RISK FACTORS                                               GENDER SPECIFIC FACTORS  [x ] Edema in the lower extremities                       (1 Point)                  [ ] Pregnancy                                                     (1 Point)  [ ] Varicose veins                                               (1 Point)                  [ ] Post-partum < 6 weeks                                   (1 Point)             [x ] BMI > 25 Kg/m2                                            (1 Point)                  [ ] Hormonal therapy  or oral contraception            (1 Point)                 [ ] Sepsis (in the previous month)                        (1 Point)                  [ ] History of pregnancy complications  [ ] Pneumonia or serious lung disease                                               [ ] Unexplained or recurrent                       (1 Point)           (in the previous month)                               (1 Point)  [ ] Abnormal pulmonary function test                     (1 Point)                 SURGERY RELATED RISK FACTORS  [ ] Acute myocardial infarction                              (1 Point)                 [ ]  Section                                            (1 Point)  [ ] Congestive heart failure (in the previous month)  (1 Point)                 [ ] Minor surgery                                                 (1 Point)   [ ] Inflammatory bowel disease                             (1 Point)                 [ ] Arthroscopic surgery                                        (2 Points)  [ ] Central venous access                                    (2 Points)                [x ] General surgery lasting more than 45 minutes   (2 Points)       [ ] Stroke (in the previous month)                          (5 Points)               [ ] Elective arthroplasty                                        (5 Points)                                                                                                                                               HEMATOLOGY RELATED FACTORS                                                 TRAUMA RELATED RISK FACTORS  [ ] Prior episodes of VTE                                     (3 Points)                 [ ] Fracture of the hip, pelvis, or leg                       (5 Points)  [ ] Positive family history for VTE                         (3 Points)                 [ ] Acute spinal cord injury (in the previous month)  (5 Points)  [ ] Prothrombin 06536 A                                      (3 Points)                 [ ] Paralysis  (less than 1 month)                          (5 Points)  [ ] Factor V Leiden                                             (3 Points)                 [ ] Multiple Trauma within 1 month                         (5 Points)  [ ] Lupus anticoagulants                                     (3 Points)                                                           [ ] Anticardiolipin antibodies                                (3 Points)                                                       [ ] High homocysteine in the blood                      (3 Points)                                             [ ] Other congenital or acquired thrombophilia       (3 Points)                                                [ ] Heparin induced thrombocytopenia                  (3 Points)                                          Total Score [      6    ]

## 2021-09-14 ENCOUNTER — APPOINTMENT (OUTPATIENT)
Dept: DISASTER EMERGENCY | Facility: CLINIC | Age: 66
End: 2021-09-14

## 2021-09-15 LAB — SARS-COV-2 N GENE NPH QL NAA+PROBE: NOT DETECTED

## 2021-09-16 ENCOUNTER — TRANSCRIPTION ENCOUNTER (OUTPATIENT)
Age: 66
End: 2021-09-16

## 2021-09-17 ENCOUNTER — APPOINTMENT (OUTPATIENT)
Dept: SURGERY | Facility: HOSPITAL | Age: 66
End: 2021-09-17

## 2021-09-17 ENCOUNTER — OUTPATIENT (OUTPATIENT)
Dept: OUTPATIENT SERVICES | Facility: HOSPITAL | Age: 66
LOS: 1 days | Discharge: HOME | End: 2021-09-17
Payer: MEDICARE

## 2021-09-17 VITALS
RESPIRATION RATE: 17 BRPM | OXYGEN SATURATION: 99 % | DIASTOLIC BLOOD PRESSURE: 91 MMHG | WEIGHT: 203.93 LBS | SYSTOLIC BLOOD PRESSURE: 162 MMHG | HEIGHT: 72 IN | HEART RATE: 58 BPM | TEMPERATURE: 98 F

## 2021-09-17 VITALS
HEART RATE: 62 BPM | OXYGEN SATURATION: 95 % | DIASTOLIC BLOOD PRESSURE: 67 MMHG | RESPIRATION RATE: 16 BRPM | SYSTOLIC BLOOD PRESSURE: 147 MMHG

## 2021-09-17 DIAGNOSIS — Z90.49 ACQUIRED ABSENCE OF OTHER SPECIFIED PARTS OF DIGESTIVE TRACT: Chronic | ICD-10-CM

## 2021-09-17 DIAGNOSIS — Z98.890 OTHER SPECIFIED POSTPROCEDURAL STATES: Chronic | ICD-10-CM

## 2021-09-17 DIAGNOSIS — Z90.89 ACQUIRED ABSENCE OF OTHER ORGANS: Chronic | ICD-10-CM

## 2021-09-17 PROCEDURE — 49652: CPT

## 2021-09-17 PROCEDURE — 49561: CPT | Mod: AS

## 2021-09-17 PROCEDURE — S2900 ROBOTIC SURGICAL SYSTEM: CPT | Mod: NC

## 2021-09-17 RX ORDER — ACETAMINOPHEN 500 MG
975 TABLET ORAL EVERY 6 HOURS
Refills: 0 | Status: DISCONTINUED | OUTPATIENT
Start: 2021-09-17 | End: 2021-10-01

## 2021-09-17 RX ORDER — ONDANSETRON 8 MG/1
4 TABLET, FILM COATED ORAL ONCE
Refills: 0 | Status: DISCONTINUED | OUTPATIENT
Start: 2021-09-17 | End: 2021-09-17

## 2021-09-17 RX ORDER — ACETAMINOPHEN 500 MG
1000 TABLET ORAL ONCE
Refills: 0 | Status: COMPLETED | OUTPATIENT
Start: 2021-09-17 | End: 2021-09-17

## 2021-09-17 RX ORDER — HYDROMORPHONE HYDROCHLORIDE 2 MG/ML
0.5 INJECTION INTRAMUSCULAR; INTRAVENOUS; SUBCUTANEOUS
Refills: 0 | Status: DISCONTINUED | OUTPATIENT
Start: 2021-09-17 | End: 2021-09-17

## 2021-09-17 RX ORDER — CYCLOBENZAPRINE HYDROCHLORIDE 10 MG/1
10 TABLET, FILM COATED ORAL THREE TIMES A DAY
Refills: 0 | Status: DISCONTINUED | OUTPATIENT
Start: 2021-09-17 | End: 2021-10-01

## 2021-09-17 RX ORDER — SODIUM CHLORIDE 9 MG/ML
1000 INJECTION, SOLUTION INTRAVENOUS
Refills: 0 | Status: DISCONTINUED | OUTPATIENT
Start: 2021-09-17 | End: 2021-09-17

## 2021-09-17 RX ORDER — SODIUM CHLORIDE 9 MG/ML
3 INJECTION INTRAMUSCULAR; INTRAVENOUS; SUBCUTANEOUS EVERY 8 HOURS
Refills: 0 | Status: DISCONTINUED | OUTPATIENT
Start: 2021-09-17 | End: 2021-09-17

## 2021-09-17 RX ORDER — CYCLOBENZAPRINE HYDROCHLORIDE 10 MG/1
1 TABLET, FILM COATED ORAL
Qty: 30 | Refills: 0
Start: 2021-09-17

## 2021-09-17 RX ORDER — ACETAMINOPHEN 500 MG
3 TABLET ORAL
Qty: 0 | Refills: 0 | DISCHARGE
Start: 2021-09-17

## 2021-09-17 RX ORDER — OXYCODONE HYDROCHLORIDE 5 MG/1
1 TABLET ORAL
Qty: 15 | Refills: 0
Start: 2021-09-17

## 2021-09-17 RX ORDER — OXYCODONE HYDROCHLORIDE 5 MG/1
5 TABLET ORAL EVERY 4 HOURS
Refills: 0 | Status: DISCONTINUED | OUTPATIENT
Start: 2021-09-17 | End: 2021-09-17

## 2021-09-17 RX ADMIN — HYDROMORPHONE HYDROCHLORIDE 0.5 MILLIGRAM(S): 2 INJECTION INTRAMUSCULAR; INTRAVENOUS; SUBCUTANEOUS at 14:01

## 2021-09-17 RX ADMIN — Medication 1000 MILLIGRAM(S): at 14:00

## 2021-09-17 RX ADMIN — HYDROMORPHONE HYDROCHLORIDE 0.5 MILLIGRAM(S): 2 INJECTION INTRAMUSCULAR; INTRAVENOUS; SUBCUTANEOUS at 13:43

## 2021-09-17 RX ADMIN — Medication 400 MILLIGRAM(S): at 13:44

## 2021-09-17 RX ADMIN — SODIUM CHLORIDE 3 MILLILITER(S): 9 INJECTION INTRAMUSCULAR; INTRAVENOUS; SUBCUTANEOUS at 08:43

## 2021-09-17 NOTE — BRIEF OPERATIVE NOTE - NSICDXBRIEFPROCEDURE_GEN_ALL_CORE_FT
PROCEDURES:  Robot-assisted repair of ventral hernia using da Matthew Xi 17-Sep-2021 13:26:06  Holly Britton

## 2021-09-17 NOTE — ASU DISCHARGE PLAN (ADULT/PEDIATRIC) - ***IN THE EVENT THAT YOU DEVELOP A COMPLICATION AND YOU ARE UNABLE TO REACH YOUR OWN PHYSICIAN, YOU MAY CONTACT:
[facility]  [address], [place] [address]      Progress Note          Subjective   Patient ID: Ninfa is a 64 year old male.    Chief Complaint   Patient presents with   • Follow-up   • Follow-up Hypertension     HPI:   Patient is a 64-year-old -American male with a diagnosis of hypertension, chronic venous stasis of the lower extremities, morbid obesity, BPH, stage III chronic kidney disease, type 2 diabetes mellitus, hyperlipidemia, and hepatitis C screening.  Today he presents to the office for follow-up on his multiple medical problems.  No history of fever, chills, or body aches.  He has been taking his medications as directed.  The patient presently takes losartan 100 mg p.o. daily, potassium chloride 20 mEq p.o. twice daily, furosemide 40 mg p.o. twice daily, and atorvastatin at 20 mg p.o. daily.  The patient states that he never filled his prescription for hydralazine 25 mg p.o. twice daily.  As of today, he has not received the COVID-19 vaccine.  He nor his wife is scheduled to take the COVID-19 vaccine.    Patient's medications, allergies, past medical, surgical, social and family histories were reviewed and updated as appropriate.    Review of Systems   Constitutional: Negative.    HENT: Negative.    Eyes: Negative.    Respiratory: Negative.    Cardiovascular: Positive for leg swelling. Negative for chest pain and palpitations.   Gastrointestinal: Negative.    Endocrine: Negative.    Genitourinary: Negative.    Musculoskeletal: Negative.    Skin: Negative.    Allergic/Immunologic: Negative.    Neurological: Negative.    Hematological: Negative.    Psychiatric/Behavioral: Negative.        Objective   Physical Exam  Vitals and nursing note reviewed.   Constitutional:       Comments: Morbidly obese middle-aged -American male in no distress.  He is alert and oriented x3.  The patient presents to the office by his wife today he is providing all of the history.  He is a  good historian.   HENT:      Head: Normocephalic and atraumatic.      Nose: No rhinorrhea.   Eyes:      General: No scleral icterus.     Extraocular Movements: Extraocular movements intact.      Conjunctiva/sclera: Conjunctivae normal.      Pupils: Pupils are equal, round, and reactive to light.   Neck:      Thyroid: No thyromegaly.      Vascular: No JVD.   Cardiovascular:      Rate and Rhythm: Normal rate and regular rhythm.      Pulses: Normal pulses.      Heart sounds: Normal heart sounds. No murmur heard.   No gallop.    Pulmonary:      Effort: Pulmonary effort is normal. No respiratory distress.      Breath sounds: Normal breath sounds. No wheezing or rales.   Chest:      Chest wall: No tenderness.   Abdominal:      Tenderness: There is no right CVA tenderness or left CVA tenderness.      Comments: Normal bowel sounds.  Obese abdomen, soft and nontender.  Negative masses.  The patient has a ventral hernia that is central extending from the xiphoid process down to the umbilicus.  Negative rebound tenderness.  Negative masses.  Negative organomegaly.   Musculoskeletal:      Cervical back: Normal range of motion and neck supple.      Comments: Full range of motion of cervical, thoracic, and lumbar spines.  Negative paravertebral muscle spasms.  Negative kyphosis or scoliosis.  Negative CVA tenderness bilaterally.    2+ edema of bilateral lower extremities extending from ankles up to mid calf.  Negative calf tenderness bilaterally.  Skin is intact.  No open lesions at this time.  Old scarring healed lesions on pretibial region of bilateral lower extremities midway between the knees and the ankles.   Lymphadenopathy:      Cervical: No cervical adenopathy.   Skin:     General: Skin is warm and dry.      Findings: No rash.   Neurological:      General: No focal deficit present.      Mental Status: He is oriented to person, place, and time. Mental status is at baseline.      Cranial Nerves: No cranial nerve deficit.       Motor: No abnormal muscle tone.   Psychiatric:         Mood and Affect: Mood normal.         Behavior: Behavior normal.         Thought Content: Thought content normal.         Judgment: Judgment normal.         Office Visit on 07/06/2021   Component Date Value Ref Range Status   • Glucose Blood, POC 07/06/2021 94  65 - 99 mg/dL Final         Assessment   Problem List Items Addressed This Visit        Circulatory    HTN (hypertension), benign     The patient's blood pressure is controlled at 134/74.  I will continue losartan at 100 mg p.o. daily, potassium chloride at 20 mEq p.o. twice daily, and furosemide at 40 mg p.o. twice daily.  Since the patient has not been taking hydralazine, I will discontinue hydralazine at this time.         Venous stasis of lower extremity     Venous stasis of the lower extremities persists.  I will continue furosemide at 40 mg p.o. twice daily.            Digestive    Obesity, morbid, BMI 40.0-49.9 (CMS/[facility])     The patient has not lost any weight.  I will continue furosemide at 40 mg p.o. twice daily.  He was again encouraged to increase his exercise, decrease his caloric intake, and reduce his weight.            Urinary    BPH associated with nocturia     BPH is uncontrolled secondary to noncompliance by the patient.  He refuses to take tamsulosin daily as directed.  I will continue to monitor.         Stage 3a chronic kidney disease (CMS/[facility])     Stage III chronic kidney disease persists.  I will continue to monitor GFR, BUN, and the creatinine level.  The patient was encouraged to avoid all nonsteroidal anti-inflammatory agents (i.e. Advil, Aleve, Motrin, Naprosyn, and Celebrex).            Endocrine    Type 2 diabetes mellitus without complication, without long-term current use of insulin (CMS/[facility]) - Primary     The patient's hemoglobin A1c in April 2021 was 6.8.  These findings are consistent with diabetes mellitus.  The patient was again encouraged to increase his exercise,  reduce his caloric intake, and reduce his weight.  No new medication was prescribed today.  I will repeat the hemoglobin A1c in 3 months.         Relevant Orders    POCT BLOOD SUGAR HAND HELD DEVICE (Completed)    SERVICE TO OPHTHALMOLOGY    Mixed hyperlipidemia     I will repeat the lipid profile in 3 months.  Until then, continue atorvastatin at 20 mg p.o. daily.            Other    RESOLVED: Need for hepatitis C screening test     Again the hepatitis C profile was negative.  Work-up is indicated at this time.              Statement Selected

## 2021-09-17 NOTE — ASU DISCHARGE PLAN (ADULT/PEDIATRIC) - CARE PROVIDER_API CALL
Sunitha Sawyer)  Surgery  [address] [place] [address]  Phone: [phone]  Fax: [phone]  Follow Up Time: 2 weeks

## 2021-09-17 NOTE — ASU DISCHARGE PLAN (ADULT/PEDIATRIC) - ASU DC SPECIAL INSTRUCTIONSFT
Follow up with Dr. Sawyer in 1-2 weeks. Please call to schedule an appointment.   NOTIFY YOUR SURGEON IF: You have any bleeding that does not stop, any pus draining from your wound, any fever (over 100.4 F) or chills, persistent nausea/vomiting, persistent diarrhea, or if your pain is not controlled on your discharge pain medications.    Wound: You may take off outer pink dressing and shower after 48 hours. After showering, pat steri strips dry. Leave the white steri strips in place, they will fall off on their own in approximately 5-7 days or they will be removed at your follow up appointment.

## 2021-09-21 DIAGNOSIS — K43.2 INCISIONAL HERNIA WITHOUT OBSTRUCTION OR GANGRENE: ICD-10-CM

## 2021-09-30 ENCOUNTER — APPOINTMENT (OUTPATIENT)
Dept: SURGERY | Facility: CLINIC | Age: 66
End: 2021-09-30
Payer: COMMERCIAL

## 2021-09-30 VITALS
WEIGHT: 194 LBS | RESPIRATION RATE: 15 BRPM | BODY MASS INDEX: 26.28 KG/M2 | HEIGHT: 72 IN | OXYGEN SATURATION: 95 % | SYSTOLIC BLOOD PRESSURE: 156 MMHG | TEMPERATURE: 96 F | DIASTOLIC BLOOD PRESSURE: 74 MMHG | HEART RATE: 73 BPM

## 2021-09-30 DIAGNOSIS — K43.9 VENTRAL HERNIA W/OUT OBSTRUCTION OR GANGRENE: ICD-10-CM

## 2021-09-30 PROCEDURE — 99024 POSTOP FOLLOW-UP VISIT: CPT

## 2021-09-30 NOTE — HISTORY OF PRESENT ILLNESS
[de-identified] : pt seen and examined. pt is s/p robotic assisted incisional hernia repair. pt states that he has been feeling better and the wounds edges are healing well. he states that the pain has subsided. on exam, wound edges are healing well and the repair is intact. he was advised to refrain from heavy lifting and his karate practices for another few weeks.  all questions and concerns were addressed in detail and the patient expressed full understanding. 
unknown

## 2022-04-13 ENCOUNTER — APPOINTMENT (OUTPATIENT)
Dept: DERMATOLOGY | Facility: CLINIC | Age: 67
End: 2022-04-13
Payer: COMMERCIAL

## 2022-04-13 VITALS — BODY MASS INDEX: 29.12 KG/M2 | HEIGHT: 72 IN | WEIGHT: 215 LBS

## 2022-04-13 DIAGNOSIS — I87.2 VENOUS INSUFFICIENCY (CHRONIC) (PERIPHERAL): ICD-10-CM

## 2022-04-13 DIAGNOSIS — L81.4 OTHER MELANIN HYPERPIGMENTATION: ICD-10-CM

## 2022-04-13 DIAGNOSIS — L30.2 CUTANEOUS AUTOSENSITIZATION: ICD-10-CM

## 2022-04-13 PROCEDURE — 17004 DESTROY PREMAL LESIONS 15/>: CPT

## 2022-04-13 PROCEDURE — 99214 OFFICE O/P EST MOD 30 MIN: CPT | Mod: 25

## 2022-04-13 RX ORDER — EFINACONAZOLE 100 MG/ML
10 SOLUTION TOPICAL
Qty: 1 | Refills: 2 | Status: ACTIVE | COMMUNITY
Start: 2022-04-13 | End: 1900-01-01

## 2022-04-13 RX ORDER — KETOCONAZOLE 20 MG/G
2 CREAM TOPICAL
Qty: 1 | Refills: 4 | Status: ACTIVE | COMMUNITY
Start: 2022-04-13 | End: 1900-01-01

## 2022-04-20 ENCOUNTER — NON-APPOINTMENT (OUTPATIENT)
Age: 67
End: 2022-04-20

## 2022-07-18 ENCOUNTER — APPOINTMENT (OUTPATIENT)
Dept: INTERNAL MEDICINE | Facility: CLINIC | Age: 67
End: 2022-07-18

## 2022-07-19 RX ORDER — CICLOPIROX 80 MG/ML
8 SOLUTION TOPICAL
Qty: 1 | Refills: 4 | Status: ACTIVE | COMMUNITY
Start: 2021-05-14 | End: 1900-01-01

## 2022-07-26 ENCOUNTER — APPOINTMENT (OUTPATIENT)
Dept: INTERNAL MEDICINE | Facility: CLINIC | Age: 67
End: 2022-07-26

## 2022-07-26 ENCOUNTER — NON-APPOINTMENT (OUTPATIENT)
Age: 67
End: 2022-07-26

## 2022-07-26 VITALS
WEIGHT: 210 LBS | BODY MASS INDEX: 28.44 KG/M2 | HEART RATE: 60 BPM | TEMPERATURE: 97.9 F | OXYGEN SATURATION: 97 % | SYSTOLIC BLOOD PRESSURE: 144 MMHG | DIASTOLIC BLOOD PRESSURE: 76 MMHG | HEIGHT: 72 IN

## 2022-07-26 VITALS — SYSTOLIC BLOOD PRESSURE: 138 MMHG | DIASTOLIC BLOOD PRESSURE: 80 MMHG

## 2022-07-26 PROCEDURE — 93000 ELECTROCARDIOGRAM COMPLETE: CPT | Mod: 59

## 2022-07-26 PROCEDURE — 99397 PER PM REEVAL EST PAT 65+ YR: CPT | Mod: 25

## 2022-07-26 PROCEDURE — 36415 COLL VENOUS BLD VENIPUNCTURE: CPT

## 2022-07-26 PROCEDURE — G0444 DEPRESSION SCREEN ANNUAL: CPT | Mod: 59

## 2022-07-28 LAB
25(OH)D3 SERPL-MCNC: 29.8 NG/ML
ALBUMIN SERPL ELPH-MCNC: 4.8 G/DL
ALP BLD-CCNC: 58 U/L
ALT SERPL-CCNC: 14 U/L
ANION GAP SERPL CALC-SCNC: 11 MMOL/L
AST SERPL-CCNC: 17 U/L
BASOPHILS # BLD AUTO: 0.04 K/UL
BASOPHILS NFR BLD AUTO: 0.8 %
BILIRUB SERPL-MCNC: 0.7 MG/DL
BUN SERPL-MCNC: 23 MG/DL
CALCIUM SERPL-MCNC: 9.5 MG/DL
CHLORIDE SERPL-SCNC: 102 MMOL/L
CHOLEST SERPL-MCNC: 216 MG/DL
CO2 SERPL-SCNC: 26 MMOL/L
CREAT SERPL-MCNC: 1 MG/DL
CRP SERPL-MCNC: <3 MG/L
EGFR: 81 ML/MIN/1.73M2
EOSINOPHIL # BLD AUTO: 0.07 K/UL
EOSINOPHIL NFR BLD AUTO: 1.3 %
ESTIMATED AVERAGE GLUCOSE: 108 MG/DL
GLUCOSE SERPL-MCNC: 99 MG/DL
HBA1C MFR BLD HPLC: 5.4 %
HCT VFR BLD CALC: 51.3 %
HDLC SERPL-MCNC: 81 MG/DL
HGB BLD-MCNC: 16.3 G/DL
IMM GRANULOCYTES NFR BLD AUTO: 0.6 %
LDLC SERPL CALC-MCNC: 111 MG/DL
LYMPHOCYTES # BLD AUTO: 1.78 K/UL
LYMPHOCYTES NFR BLD AUTO: 33.6 %
MAN DIFF?: NORMAL
MCHC RBC-ENTMCNC: 30.5 PG
MCHC RBC-ENTMCNC: 31.8 GM/DL
MCV RBC AUTO: 95.9 FL
MONOCYTES # BLD AUTO: 0.58 K/UL
MONOCYTES NFR BLD AUTO: 10.9 %
NEUTROPHILS # BLD AUTO: 2.8 K/UL
NEUTROPHILS NFR BLD AUTO: 52.8 %
NONHDLC SERPL-MCNC: 135 MG/DL
PLATELET # BLD AUTO: 216 K/UL
POTASSIUM SERPL-SCNC: 4.4 MMOL/L
PROT SERPL-MCNC: 7.5 G/DL
PSA SERPL-MCNC: 0.69 NG/ML
RBC # BLD: 5.35 M/UL
RBC # FLD: 13.7 %
SODIUM SERPL-SCNC: 139 MMOL/L
TRIGL SERPL-MCNC: 120 MG/DL
TSH SERPL-ACNC: 2.53 UIU/ML
WBC # FLD AUTO: 5.3 K/UL

## 2022-08-12 RX ORDER — TRIAMCINOLONE ACETONIDE 1 MG/G
0.1 OINTMENT TOPICAL
Qty: 1 | Refills: 0 | Status: ACTIVE | COMMUNITY
Start: 2022-04-13 | End: 1900-01-01

## 2022-09-19 ENCOUNTER — APPOINTMENT (OUTPATIENT)
Dept: DERMATOLOGY | Facility: CLINIC | Age: 67
End: 2022-09-19

## 2022-09-19 DIAGNOSIS — B35.1 TINEA UNGUIUM: ICD-10-CM

## 2022-09-19 DIAGNOSIS — L82.1 OTHER SEBORRHEIC KERATOSIS: ICD-10-CM

## 2022-09-19 DIAGNOSIS — L57.0 ACTINIC KERATOSIS: ICD-10-CM

## 2022-09-19 DIAGNOSIS — B35.3 TINEA PEDIS: ICD-10-CM

## 2022-09-19 PROCEDURE — 99213 OFFICE O/P EST LOW 20 MIN: CPT | Mod: 25

## 2022-09-19 PROCEDURE — 17003 DESTRUCT PREMALG LES 2-14: CPT | Mod: 59

## 2022-09-19 PROCEDURE — 17000 DESTRUCT PREMALG LESION: CPT | Mod: 59

## 2022-10-12 ENCOUNTER — NON-APPOINTMENT (OUTPATIENT)
Age: 67
End: 2022-10-12

## 2022-10-12 ENCOUNTER — EMERGENCY (EMERGENCY)
Facility: HOSPITAL | Age: 67
LOS: 0 days | Discharge: HOME | End: 2022-10-12
Attending: EMERGENCY MEDICINE

## 2022-10-12 VITALS
HEART RATE: 86 BPM | RESPIRATION RATE: 16 BRPM | OXYGEN SATURATION: 97 % | TEMPERATURE: 98 F | SYSTOLIC BLOOD PRESSURE: 137 MMHG | WEIGHT: 220.02 LBS | HEIGHT: 72 IN | DIASTOLIC BLOOD PRESSURE: 83 MMHG

## 2022-10-12 VITALS
RESPIRATION RATE: 17 BRPM | OXYGEN SATURATION: 93 % | TEMPERATURE: 98 F | HEART RATE: 63 BPM | DIASTOLIC BLOOD PRESSURE: 82 MMHG | SYSTOLIC BLOOD PRESSURE: 156 MMHG

## 2022-10-12 DIAGNOSIS — R42 DIZZINESS AND GIDDINESS: ICD-10-CM

## 2022-10-12 DIAGNOSIS — S01.21XA LACERATION WITHOUT FOREIGN BODY OF NOSE, INITIAL ENCOUNTER: ICD-10-CM

## 2022-10-12 DIAGNOSIS — Z91.018 ALLERGY TO OTHER FOODS: ICD-10-CM

## 2022-10-12 DIAGNOSIS — Z98.890 OTHER SPECIFIED POSTPROCEDURAL STATES: Chronic | ICD-10-CM

## 2022-10-12 DIAGNOSIS — S01.81XA LACERATION WITHOUT FOREIGN BODY OF OTHER PART OF HEAD, INITIAL ENCOUNTER: ICD-10-CM
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
## 2022-10-12 DIAGNOSIS — R55 SYNCOPE AND COLLAPSE: ICD-10-CM

## 2022-10-12 DIAGNOSIS — S09.93XA UNSPECIFIED INJURY OF FACE, INITIAL ENCOUNTER: ICD-10-CM

## 2022-10-12 DIAGNOSIS — Z98.890 OTHER SPECIFIED POSTPROCEDURAL STATES: ICD-10-CM

## 2022-10-12 DIAGNOSIS — Z87.19 PERSONAL HISTORY OF OTHER DISEASES OF THE DIGESTIVE SYSTEM: ICD-10-CM

## 2022-10-12 DIAGNOSIS — Z90.89 ACQUIRED ABSENCE OF OTHER ORGANS: Chronic | ICD-10-CM
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
## 2022-10-12 DIAGNOSIS — Z90.49 ACQUIRED ABSENCE OF OTHER SPECIFIED PARTS OF DIGESTIVE TRACT: Chronic | ICD-10-CM
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
## 2022-10-12 LAB
ALBUMIN SERPL ELPH-MCNC: 3.8 G/DL — SIGNIFICANT CHANGE UP (ref 3.3–5)
ALP SERPL-CCNC: 58 U/L — SIGNIFICANT CHANGE UP (ref 40–120)
ALT FLD-CCNC: 20 U/L — SIGNIFICANT CHANGE UP (ref 12–78)
ANION GAP SERPL CALC-SCNC: 7 MMOL/L — SIGNIFICANT CHANGE UP (ref 5–17)
AST SERPL-CCNC: 15 U/L — SIGNIFICANT CHANGE UP (ref 15–37)
BASOPHILS # BLD AUTO: 0.03 K/UL — SIGNIFICANT CHANGE UP (ref 0–0.2)
BASOPHILS NFR BLD AUTO: 0.4 % — SIGNIFICANT CHANGE UP (ref 0–2)
BILIRUB SERPL-MCNC: 0.7 MG/DL — SIGNIFICANT CHANGE UP (ref 0.2–1.2)
BUN SERPL-MCNC: 20 MG/DL — SIGNIFICANT CHANGE UP (ref 7–23)
CALCIUM SERPL-MCNC: 9.1 MG/DL — SIGNIFICANT CHANGE UP (ref 8.5–10.1)
CHLORIDE SERPL-SCNC: 105 MMOL/L — SIGNIFICANT CHANGE UP (ref 96–108)
CO2 SERPL-SCNC: 29 MMOL/L — SIGNIFICANT CHANGE UP (ref 22–31)
CREAT SERPL-MCNC: 1 MG/DL — SIGNIFICANT CHANGE UP (ref 0.5–1.3)
EGFR: 81 ML/MIN/1.73M2 — SIGNIFICANT CHANGE UP
EOSINOPHIL # BLD AUTO: 0.03 K/UL — SIGNIFICANT CHANGE UP (ref 0–0.5)
EOSINOPHIL NFR BLD AUTO: 0.4 % — SIGNIFICANT CHANGE UP (ref 0–6)
GLUCOSE SERPL-MCNC: 90 MG/DL — SIGNIFICANT CHANGE UP (ref 70–99)
HCT VFR BLD CALC: 47.2 % — SIGNIFICANT CHANGE UP (ref 39–50)
HGB BLD-MCNC: 15.5 G/DL — SIGNIFICANT CHANGE UP (ref 13–17)
IMM GRANULOCYTES NFR BLD AUTO: 0.4 % — SIGNIFICANT CHANGE UP (ref 0–0.9)
LYMPHOCYTES # BLD AUTO: 1.13 K/UL — SIGNIFICANT CHANGE UP (ref 1–3.3)
LYMPHOCYTES # BLD AUTO: 14 % — SIGNIFICANT CHANGE UP (ref 13–44)
MCHC RBC-ENTMCNC: 29.9 PG — SIGNIFICANT CHANGE UP (ref 27–34)
MCHC RBC-ENTMCNC: 32.8 G/DL — SIGNIFICANT CHANGE UP (ref 32–36)
MCV RBC AUTO: 90.9 FL — SIGNIFICANT CHANGE UP (ref 80–100)
MONOCYTES # BLD AUTO: 0.78 K/UL — SIGNIFICANT CHANGE UP (ref 0–0.9)
MONOCYTES NFR BLD AUTO: 9.6 % — SIGNIFICANT CHANGE UP (ref 2–14)
NEUTROPHILS # BLD AUTO: 6.1 K/UL — SIGNIFICANT CHANGE UP (ref 1.8–7.4)
NEUTROPHILS NFR BLD AUTO: 75.2 % — SIGNIFICANT CHANGE UP (ref 43–77)
NRBC # BLD: 0 /100 WBCS — SIGNIFICANT CHANGE UP (ref 0–0)
PLATELET # BLD AUTO: 239 K/UL — SIGNIFICANT CHANGE UP (ref 150–400)
POTASSIUM SERPL-MCNC: 3.6 MMOL/L — SIGNIFICANT CHANGE UP (ref 3.5–5.3)
POTASSIUM SERPL-SCNC: 3.6 MMOL/L — SIGNIFICANT CHANGE UP (ref 3.5–5.3)
PROT SERPL-MCNC: 7.7 GM/DL — SIGNIFICANT CHANGE UP (ref 6–8.3)
RBC # BLD: 5.19 M/UL — SIGNIFICANT CHANGE UP (ref 4.2–5.8)
RBC # FLD: 13 % — SIGNIFICANT CHANGE UP (ref 10.3–14.5)
SODIUM SERPL-SCNC: 141 MMOL/L — SIGNIFICANT CHANGE UP (ref 135–145)
TROPONIN I, HIGH SENSITIVITY RESULT: 5.8 NG/L — SIGNIFICANT CHANGE UP
WBC # BLD: 8.1 K/UL — SIGNIFICANT CHANGE UP (ref 3.8–10.5)
WBC # FLD AUTO: 8.1 K/UL — SIGNIFICANT CHANGE UP (ref 3.8–10.5)

## 2022-10-12 PROCEDURE — 93010 ELECTROCARDIOGRAM REPORT: CPT

## 2022-10-12 PROCEDURE — 70450 CT HEAD/BRAIN W/O DYE: CPT | Mod: 26,MA

## 2022-10-12 PROCEDURE — 71045 X-RAY EXAM CHEST 1 VIEW: CPT | Mod: 26

## 2022-10-12 PROCEDURE — 70486 CT MAXILLOFACIAL W/O DYE: CPT | Mod: 26,MA

## 2022-10-12 PROCEDURE — 99285 EMERGENCY DEPT VISIT HI MDM: CPT

## 2022-10-12 RX ADMIN — Medication 1 TABLET(S): at 20:54

## 2022-10-12 NOTE — ED PROVIDER NOTE - PHYSICAL EXAMINATION
Gen: Alert, NAD, well appearing  Head: NC, deep laceration of left forehead with jagged but well aprroximated edges 4 cm, second curvalinear laceration on nasal bridge 4 cm, PERRL, EOMI, normal lids/conjunctiva  ENT: normal hearing, patent oropharynx without erythema/exudate, uvula midline  Neck: +supple, no tenderness/meningismus/JVD, +Trachea midline  Pulm: Bilateral BS, normal resp effort, no wheeze/stridor/retractions  CV: RRR, no M/R/G, +dist pulses  Abd: soft, NT/ND, Negative Langston signs, +BS, no palpable masses  Mskel: no edema/erythema/cyanosis  Skin: no rash, warm/dry  Neuro: AAOx3, no apparent sensory/motor deficits, coordination intact

## 2022-10-12 NOTE — ED PROVIDER NOTE - PATIENT PORTAL LINK FT
You can access the FollowMyHealth Patient Portal offered by [facility] by registering at the following website: [email or web address]. By joining [employer]’s FollowMyHealth portal, you will also be able to view your health information using other applications (apps) compatible with our system.

## 2022-10-12 NOTE — ED PROVIDER NOTE - OBJECTIVE STATEMENT
Pertinent PMH/PSH/FHx/SHx and Review of Systems contained within:  Patient presents to the ED for syncope and facial injury.  Patient had been laying on couch, eating and talking on the phone.  While talking and eating in that position, he started to choke.  He stood up quickly to relieve the obstruction and when he got up felt lightheaded and passed out immediately.  Patient struck his face and nose where he has jagged lacerations.  Patient says that only a few seconds had passed before he was able to get back up.  No prodrome of sob, chest pain, palpitations.  Denies use of blood thinners.  Patient is active in [employer] and reports a normal history and recent physical which was normal.  Tetanus is UTD.  He requests plastic surgery repair as opposed to fixation by ED physician.     Relevant PMHx/SHx/SOCHx/FAMH:  Abdominal hernia repair, diverticulitis, denies family h/o sudden cardiac death  Patient denies EtOH/tobacco/illicit substance use.    ROS: No fever/chills, No photophobia/eye pain/changes in vision, No ear pain/sore throat/dysphagia, No chest pain/palpitations, no SOB/cough/wheeze/stridor, No abdominal pain, No N/V/D/melena, no dysuria/frequency/discharge, No neck/back pain, no rash, no changes in neurological status/function.

## 2022-10-12 NOTE — ED ADULT TRIAGE NOTE - PAIN: PRESENCE, MLM
Infectious Diseases Progress Note:    SUBJECTIVE: Patient seen and examined at bedside. Continues to have fevers.  Alert this AM, able to respond to yes and no questions.  Comfortable appearing.    ENDOCARDITIS/SEPSIS    Endocarditis  CAMERON (acute kidney injury)  Acute endocarditis due to other organism      Allergies    No Known Allergies    Intolerances        ANTIBIOTICS/RELEVANT:  antimicrobials  nafcillin  IVPB 2 Gram(s) IV Intermittent every 4 hours    immunologic:      OTHER:  acetaminophen    Suspension .. 650 milliGRAM(s) Oral every 6 hours PRN  albumin human 25% IVPB 50 milliLiter(s) IV Intermittent every 1 hour  albuterol/ipratropium for Nebulization 3 milliLiter(s) Nebulizer every 4 hours  artificial  tears Solution 1 Drop(s) Both EYES every 6 hours  bisacodyl Suppository 10 milliGRAM(s) Rectal every 24 hours  chlorhexidine 0.12% Liquid 15 milliLiter(s) Oral Mucosa every 12 hours  chlorhexidine 2% Cloths 1 Application(s) Topical daily  dextrose 40% Gel 15 Gram(s) Oral once PRN  dextrose 5%. 1000 milliLiter(s) IV Continuous <Continuous>  dextrose 50% Injectable 12.5 Gram(s) IV Push once  dextrose 50% Injectable 25 Gram(s) IV Push once  dextrose 50% Injectable 25 Gram(s) IV Push once  fentaNYL   Infusion. 0.5 MICROgram(s)/kG/Hr IV Continuous <Continuous>  gabapentin   Solution 100 milliGRAM(s) Enteral Tube <User Schedule>  glucagon  Injectable 1 milliGRAM(s) IntraMuscular once PRN  heparin  Injectable 5000 Unit(s) SubCutaneous every 8 hours  hydrocortisone sodium succinate Injectable 25 milliGRAM(s) IV Push every 12 hours  insulin lispro (HumaLOG) corrective regimen sliding scale   SubCutaneous every 6 hours  iohexol 300 mG (iodine)/mL Oral Solution 30 milliLiter(s) Oral once  LORazepam     Tablet 2 milliGRAM(s) Oral every 6 hours  midodrine 10 milliGRAM(s) Oral every 8 hours  multivitamin/minerals/iron Oral Solution (CENTRUM) 15 milliLiter(s) Enteral Tube daily  norepinephrine Infusion 0.05 MICROgram(s)/kG/Min IV Continuous <Continuous>  pantoprazole   Suspension 40 milliGRAM(s) Oral daily  polyethylene glycol 3350 17 Gram(s) Oral at bedtime  propofol Infusion 5 MICROgram(s)/kG/Min IV Continuous <Continuous>  QUEtiapine 100 milliGRAM(s) Oral every 12 hours  sodium chloride 0.9% lock flush 3 milliLiter(s) IV Push every 8 hours  vitamin E 400 International Unit(s) Oral daily  zinc sulfate 220 milliGRAM(s) Oral daily      Objective:  Vital Signs Last 24 Hrs  T(C): 38 (05 Dec 2019 09:13), Max: 38.6 (04 Dec 2019 14:15)  T(F): 100.4 (05 Dec 2019 09:13), Max: 101.5 (04 Dec 2019 14:15)  HR: 129 (05 Dec 2019 12:10) (122 - 136)  BP: 107/69 (04 Dec 2019 23:20) (86/53 - 107/69)  BP(mean): 83 (04 Dec 2019 23:20) (59 - 83)  RR: 29 (05 Dec 2019 12:10) (11 - 38)  SpO2: 100% (05 Dec 2019 12:10) (99% - 100%)    PHYSICAL EXAM:  Constitutional: frail, + trach, follows commands and tracks with eyes  Eyes: No scleral icterus, PERRL  Ear/Nose/Throat: + trach	  Neck: no JVD, supple  Respiratory: Diffuse rhonchi, 2 chest tubes on R, 1 on Left  Cardiovascular: S1S2, RRR, + systolic murmur  Gastrointestinal: soft, NTND, (+) BS, no HSM  Extremities: necrosis of fingers and toes b/l    LABS:                        8.5    4.00  )-----------( 142      ( 05 Dec 2019 05:26 )             25.4     12-05    136  |  101  |  39<H>  ----------------------------<  106<H>  3.3<L>   |  17<L>  |  2.92<H>    Ca    8.0<L>      05 Dec 2019 05:26  Phos  4.5     12-05  Mg     2.0     12-05    TPro  6.0  /  Alb  2.1<L>  /  TBili  1.2  /  DBili  x   /  AST  22  /  ALT  19  /  AlkPhos  83  12-04          MICROBIOLOGY:    Culture - Blood (collected 12-04-19 @ 14:25)  Source: .Blood Blood  Preliminary Report (12-05-19 @ 03:00):    No growth at 12 hours    Culture - Blood (collected 12-04-19 @ 14:25)  Source: .Blood Blood  Preliminary Report (12-05-19 @ 03:00):    No growth at 12 hours    Culture - Sputum (collected 12-04-19 @ 14:10)  Source: .Sputum Sputum  Gram Stain (12-04-19 @ 21:41):    Few epithelial cells    Few-moderate White blood cells    Moderate Gram Negative Rods    Few Yeast  Preliminary Report (12-05-19 @ 08:28):    Culture in progress      Culture Results:   No growth at 12 hours (12-04 @ 14:25)  Culture Results:   No growth at 12 hours (12-04 @ 14:25)  Culture Results:   Culture in progress (12-04 @ 14:10)          RADIOLOGY & ADDITIONAL STUDIES: complains of pain/discomfort

## 2022-10-12 NOTE — ED ADULT NURSE NOTE - OBJECTIVE STATEMENT
Patient A&O x3 came in with complaints of fainting. Patient was choking on piece of chicken and was struggling to get out of the recliner he was sitting in and couldn't catch a breath. Once he caught his breath, he fainted and his head hit the fireplace. Patient said he woke up after about 2 seconds and put a bandage on the lacerations as best as he could. Patient has lacerations across his forehead and on his nose and is currently bandaged.  PT denies blood thinners and not on any medications.

## 2022-10-12 NOTE — ED ADULT NURSE NOTE - NSICDXPASTSURGICALHX_GEN_ALL_CORE_FT
PAST SURGICAL HISTORY:  H/O colonoscopy 8/2020    History of tonsillectomy childhood    S/P partial colectomy     S/P right inguinal hernia repair 2012

## 2022-10-12 NOTE — ED PROVIDER NOTE - CARE PLAN
1 Principal Discharge DX:	Vasovagal syncope  Secondary Diagnosis:	Facial laceration, initial encounter

## 2022-10-12 NOTE — ED PROVIDER NOTE - CLINICAL SUMMARY MEDICAL DECISION MAKING FREE TEXT BOX
Patient with fall and syncope which seems very vaso vagal based on history.  VSS.  Neuro exam intact.  Lab values reviewed, there are no values which require acute intervention.  CXR clear.  CT head wnl, CT maxillo with nasal bone fractures.  Patient refuses repair by ED physician, prefers plastics, case discussed with Dr. Boykin plastics who can see patient in office at 8 am this morning.  Wound washed and covered with steri strips, will dc with augmentin, information for plastics given to patient, Dr. Boykin aware.  Return precautions for infection, bleeding, other injuries were discussed.  Andrade Sae syncope score 0.

## 2022-10-14 ENCOUNTER — NON-APPOINTMENT (OUTPATIENT)
Age: 67
End: 2022-10-14

## 2022-10-20 ENCOUNTER — OUTPATIENT (OUTPATIENT)
Dept: OUTPATIENT SERVICES | Facility: HOSPITAL | Age: 67
LOS: 1 days | Discharge: HOME | End: 2022-10-20

## 2022-10-20 VITALS
SYSTOLIC BLOOD PRESSURE: 123 MMHG | HEIGHT: 72 IN | RESPIRATION RATE: 18 BRPM | HEART RATE: 67 BPM | TEMPERATURE: 98 F | DIASTOLIC BLOOD PRESSURE: 73 MMHG | WEIGHT: 215.39 LBS | OXYGEN SATURATION: 96 %

## 2022-10-20 DIAGNOSIS — Z98.890 OTHER SPECIFIED POSTPROCEDURAL STATES: Chronic | ICD-10-CM

## 2022-10-20 DIAGNOSIS — Z90.89 ACQUIRED ABSENCE OF OTHER ORGANS: Chronic | ICD-10-CM

## 2022-10-20 DIAGNOSIS — Z01.818 ENCOUNTER FOR OTHER PREPROCEDURAL EXAMINATION: ICD-10-CM

## 2022-10-20 DIAGNOSIS — Z87.19 PERSONAL HISTORY OF OTHER DISEASES OF THE DIGESTIVE SYSTEM: Chronic | ICD-10-CM

## 2022-10-20 DIAGNOSIS — S02.2XXA FRACTURE OF NASAL BONES, INITIAL ENCOUNTER FOR CLOSED FRACTURE: ICD-10-CM

## 2022-10-20 DIAGNOSIS — Z90.49 ACQUIRED ABSENCE OF OTHER SPECIFIED PARTS OF DIGESTIVE TRACT: Chronic | ICD-10-CM

## 2022-10-20 LAB
ANION GAP SERPL CALC-SCNC: 8 MMOL/L — SIGNIFICANT CHANGE UP (ref 5–17)
BUN SERPL-MCNC: 25 MG/DL — HIGH (ref 7–23)
CALCIUM SERPL-MCNC: 9.4 MG/DL — SIGNIFICANT CHANGE UP (ref 8.5–10.1)
CHLORIDE SERPL-SCNC: 106 MMOL/L — SIGNIFICANT CHANGE UP (ref 96–108)
CO2 SERPL-SCNC: 29 MMOL/L — SIGNIFICANT CHANGE UP (ref 22–31)
CREAT SERPL-MCNC: 0.97 MG/DL — SIGNIFICANT CHANGE UP (ref 0.5–1.3)
EGFR: 85 ML/MIN/1.73M2 — SIGNIFICANT CHANGE UP
FLUAV AG NPH QL: SIGNIFICANT CHANGE UP
FLUBV AG NPH QL: SIGNIFICANT CHANGE UP
GLUCOSE SERPL-MCNC: 90 MG/DL — SIGNIFICANT CHANGE UP (ref 70–99)
HCT VFR BLD CALC: 48.5 % — SIGNIFICANT CHANGE UP (ref 39–50)
HGB BLD-MCNC: 15.7 G/DL — SIGNIFICANT CHANGE UP (ref 13–17)
MCHC RBC-ENTMCNC: 29.7 PG — SIGNIFICANT CHANGE UP (ref 27–34)
MCHC RBC-ENTMCNC: 32.4 G/DL — SIGNIFICANT CHANGE UP (ref 32–36)
MCV RBC AUTO: 91.7 FL — SIGNIFICANT CHANGE UP (ref 80–100)
NRBC # BLD: 0 /100 WBCS — SIGNIFICANT CHANGE UP (ref 0–0)
PLATELET # BLD AUTO: 269 K/UL — SIGNIFICANT CHANGE UP (ref 150–400)
POTASSIUM SERPL-MCNC: 3.7 MMOL/L — SIGNIFICANT CHANGE UP (ref 3.5–5.3)
POTASSIUM SERPL-SCNC: 3.7 MMOL/L — SIGNIFICANT CHANGE UP (ref 3.5–5.3)
RBC # BLD: 5.29 M/UL — SIGNIFICANT CHANGE UP (ref 4.2–5.8)
RBC # FLD: 12.6 % — SIGNIFICANT CHANGE UP (ref 10.3–14.5)
SARS-COV-2 RNA SPEC QL NAA+PROBE: SIGNIFICANT CHANGE UP
SODIUM SERPL-SCNC: 143 MMOL/L — SIGNIFICANT CHANGE UP (ref 135–145)
WBC # BLD: 5.67 K/UL — SIGNIFICANT CHANGE UP (ref 3.8–10.5)
WBC # FLD AUTO: 5.67 K/UL — SIGNIFICANT CHANGE UP (ref 3.8–10.5)

## 2022-10-20 NOTE — H&P PST ADULT - NSICDXPASTSURGICALHX_GEN_ALL_CORE_FT
PAST SURGICAL HISTORY:  H/O colonoscopy 8/2020    History of diverticulitis partial colectomy 2021    History of incisional hernia repair     History of tonsillectomy childhood    S/P partial colectomy     S/P right inguinal hernia repair 2012

## 2022-10-20 NOTE — H&P PST ADULT - HISTORY OF PRESENT ILLNESS
..... 67M pmh diverticulitis s/p partial colectomy reports striking nose on metal fire place while choking on food, her went to ED had lacerations sutured found to have fracture of nasal bones here for PST for scheduled closed reduction of nasal fracture  This patient denies any fever, cough, sob, flu like symptoms or travel outside of the [place] in the past 30 days

## 2022-10-20 NOTE — H&P PST ADULT - ASSESSMENT
67M pmh diverticulitis s/p partial colectomy reports striking nose on metal fire place while choking on food, her went to ED had lacerations sutured found to have fracture of nasal bones here for PST for scheduled closed reduction of nasal fracture  CAPRINI SCORE [CLOT]    AGE RELATED RISK FACTORS                                                       MOBILITY RELATED FACTORS  [ ] Age 41-60 years                                            (1 Point)                  [ ] Bed rest                                                        (1 Point)  [x ] Age: 61-74 years                                           (2 Points)                 [ ] Plaster cast                                                   (2 Points)  [ ] Age= 75 years                                              (3 Points)                 [ ] Bed bound for more than 72 hours                 (2 Points)    DISEASE RELATED RISK FACTORS                                               GENDER SPECIFIC FACTORS  [ ] Edema in the lower extremities                       (1 Point)                  [ ] Pregnancy                                                     (1 Point)  [ ] Varicose veins                                               (1 Point)                  [ ] Post-partum < 6 weeks                                   (1 Point)             [x ] BMI > 25 Kg/m2                                            (1 Point)                  [ ] Hormonal therapy  or oral contraception          (1 Point)                 [ ] Sepsis (in the previous month)                        (1 Point)                  [ ] History of pregnancy complications                 (1 point)  [ ] Pneumonia or serious lung disease                                               [ ] Unexplained or recurrent                     (1 Point)           (in the previous month)                               (1 Point)  [ ] Abnormal pulmonary function test                     (1 Point)                 SURGERY RELATED RISK FACTORS  [ ] Acute myocardial infarction                              (1 Point)                 [ ]  Section                                             (1 Point)  [ ] Congestive heart failure (in the previous month)  (1 Point)               [ ] Minor surgery                                                  (1 Point)   [ ] Inflammatory bowel disease                             (1 Point)                 [ ] Arthroscopic surgery                                        (2 Points)  [ ] Central venous access                                      (2 Points)                [ x] General surgery lasting more than 45 minutes   (2 Points)       [ ] Stroke (in the previous month)                          (5 Points)               [ ] Elective arthroplasty                                         (5 Points)                                                                                                                                               HEMATOLOGY RELATED FACTORS                                                 TRAUMA RELATED RISK FACTORS  [ ] Prior episodes of VTE                                     (3 Points)                [ ] Fracture of the hip, pelvis, or leg                       (5 Points)  [ ] Positive family history for VTE                         (3 Points)                 [ ] Acute spinal cord injury (in the previous month)  (5 Points)  [ ] Prothrombin 53861 A                                     (3 Points)                 [ ] Paralysis  (less than 1 month)                             (5 Points)  [ ] Factor V Leiden                                             (3 Points)                  [ ] Multiple Trauma within 1 month                        (5 Points)  [ ] Lupus anticoagulants                                     (3 Points)                                                           [ ] Anticardiolipin antibodies                               (3 Points)                                                       [ ] High homocysteine in the blood                      (3 Points)                                             [ ] Other congenital or acquired thrombophilia      (3 Points)                                                [ ] Heparin induced thrombocytopenia                  (3 Points)                                          Total Score [   5       ]    Caprini Score 0 - 2:  Low Risk, No VTE Prophylaxis required for most patients, encourage ambulation  Caprini Score 3 - 6:  At Risk, pharmacologic VTE prophylaxis is indicated for most patients (in the absence of a contraindication)  Caprini Score Greater than or = 7:  High Risk, pharmacologic VTE prophylaxis is indicated for most patients (in the absence of a contraindication)

## 2022-10-20 NOTE — H&P PST ADULT - PROBLEM SELECTOR PLAN 1
closed reduction nasal fracture  Pre-op instructions given, patient verbalized understanding  Chlorhexidine wash instructions given   Anesthesiologist to review PST labs, EKG, required clearances and optimization for surgery.

## 2022-10-23 ENCOUNTER — TRANSCRIPTION ENCOUNTER (OUTPATIENT)
Age: 67
End: 2022-10-23

## 2022-10-24 ENCOUNTER — OUTPATIENT (OUTPATIENT)
Dept: OUTPATIENT SERVICES | Facility: HOSPITAL | Age: 67
LOS: 1 days | Discharge: HOME | End: 2022-10-24

## 2022-10-24 ENCOUNTER — TRANSCRIPTION ENCOUNTER (OUTPATIENT)
Age: 67
End: 2022-10-24

## 2022-10-24 VITALS
RESPIRATION RATE: 16 BRPM | DIASTOLIC BLOOD PRESSURE: 75 MMHG | SYSTOLIC BLOOD PRESSURE: 123 MMHG | HEART RATE: 62 BPM | OXYGEN SATURATION: 95 %

## 2022-10-24 VITALS
DIASTOLIC BLOOD PRESSURE: 86 MMHG | SYSTOLIC BLOOD PRESSURE: 130 MMHG | TEMPERATURE: 99 F | RESPIRATION RATE: 17 BRPM | OXYGEN SATURATION: 96 % | HEIGHT: 72 IN | HEART RATE: 61 BPM | WEIGHT: 210.1 LBS

## 2022-10-24 DIAGNOSIS — Z98.890 OTHER SPECIFIED POSTPROCEDURAL STATES: Chronic | ICD-10-CM

## 2022-10-24 DIAGNOSIS — Z90.89 ACQUIRED ABSENCE OF OTHER ORGANS: Chronic | ICD-10-CM

## 2022-10-24 DIAGNOSIS — Z87.19 PERSONAL HISTORY OF OTHER DISEASES OF THE DIGESTIVE SYSTEM: Chronic | ICD-10-CM

## 2022-10-24 DIAGNOSIS — Z90.49 ACQUIRED ABSENCE OF OTHER SPECIFIED PARTS OF DIGESTIVE TRACT: Chronic | ICD-10-CM

## 2022-10-24 RX ORDER — SODIUM CHLORIDE 9 MG/ML
3 INJECTION INTRAMUSCULAR; INTRAVENOUS; SUBCUTANEOUS EVERY 8 HOURS
Refills: 0 | Status: DISCONTINUED | OUTPATIENT
Start: 2022-10-24 | End: 2022-10-24

## 2022-10-24 RX ORDER — ONDANSETRON 8 MG/1
4 TABLET, FILM COATED ORAL ONCE
Refills: 0 | Status: DISCONTINUED | OUTPATIENT
Start: 2022-10-24 | End: 2022-10-24

## 2022-10-24 RX ORDER — FENTANYL CITRATE 50 UG/ML
25 INJECTION INTRAVENOUS
Refills: 0 | Status: DISCONTINUED | OUTPATIENT
Start: 2022-10-24 | End: 2022-10-24

## 2022-10-24 RX ORDER — SODIUM CHLORIDE 9 MG/ML
1000 INJECTION, SOLUTION INTRAVENOUS
Refills: 0 | Status: DISCONTINUED | OUTPATIENT
Start: 2022-10-24 | End: 2022-10-24

## 2022-10-24 NOTE — ASU PATIENT PROFILE, ADULT - FALL HARM RISK - UNIVERSAL INTERVENTIONS
Bed in lowest position, wheels locked, appropriate side rails in place/Call bell, personal items and telephone in reach/Instruct patient to call for assistance before getting out of bed or chair/Non-slip footwear when patient is out of bed/[place] to call system/Physically safe environment - no spills, clutter or unnecessary equipment/Purposeful Proactive Rounding/Room/bathroom lighting operational, light cord in reach
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
## 2022-10-27 DIAGNOSIS — S02.2XXA FRACTURE OF NASAL BONES, INITIAL ENCOUNTER FOR CLOSED FRACTURE: ICD-10-CM

## 2022-10-27 DIAGNOSIS — Z90.49 ACQUIRED ABSENCE OF OTHER SPECIFIED PARTS OF DIGESTIVE TRACT: ICD-10-CM
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
## 2023-04-20 NOTE — PRE-ANESTHESIA EVALUATION ADULT - LAST CARDIAC ANGIOGRAM/IMAGING
DATE: 23      PATIENT: Gilma Henry  MRN:  [id number]  :  1960      REASON FOR VISIT:    Follow up and Medicare Wellness Visit.    HISTORY OF PRESENT ILLNESS:    , 3 kids.    Chr Lumbago and Knee pain -Stable.  Obesity - No better.    Feels generally fine.    Patient Care Team: PCP (Dr. Dong) only.    Vision Screening: Grossly OK.    Hearing Screening: Reasonable and stable.    Depression and Cognitive Screening: Memory and Thought process reasonable and stable. No Depression.    Power of : Daughter in charge. Encouraged to get Living Will. Potential issues discussed.    REVIEW OF SYSTEMS:    Cardiac - No CP, Palpitation, or Dyspnea.  Pulmonary - No Dyspnea, Cough etc.  GI - No bleeding, Dysphagia, Bowel problems.  Neuro - No Headache, Syncope, Weakness etc.   - No problems with Urination.  Psych - No symptoms of Depression etc.    ALLERGIES:  No Known Allergies      Current Outpatient Medications   Medication Sig Dispense Refill   • triamcinolone (ARISTOCORT) 0.1 % cream Apply topically 2 times daily as needed (Rash). 45 g 1   • cyclobenzaprine (FLEXERIL) 10 MG tablet Take 1 tablet by mouth daily as needed for Muscle spasms. 30 tablet 0   • cyclobenzaprine (FLEXERIL) 10 MG tablet Take 1 tablet by mouth daily as needed for Muscle spasms. 30 tablet 0   • ibuprofen (MOTRIN) 800 MG tablet TAKE 1 TABLET BY MOUTH TWICE DAILY AS NEEDED FOR PAIN 60 tablet 1   • ibuprofen (MOTRIN) 800 MG tablet Take 1 tablet by mouth 2 times daily as needed for Pain. 60 tablet 1   • erythromycin (ILOTYCIN) ophthalmic ointment Apply 1 cm strip in affected eye every 4-6 hours for 5 days. 3.5 g 0   • azelastine (OPTIVAR) 0.05 % ophthalmic solution Place 1 drop into both eyes 2 times daily as needed (red eyes). 6 mL 12   • acitretin (SORIATANE) 25 MG capsule Take one capsule every other day 30 capsule 0   • urea (CARMOL 40) 40 % cream Apply twice daily.  For a stronger treatment, cover with wet cloth and saran  at night. 85 g 1   • clotrimazole-betamethasone (LOTRISONE) 1-0.05 % cream Apply 1 application topically 2 times daily as needed (Rash). 45 g 3     No current facility-administered medications for this visit.         History reviewed. No pertinent past medical history.    Med list, relevant past Medical, Family, Social history briefly reviewed.    NOTABLE PAST EVENTS:    Refuses Colonoscopy etc !     Family history: No major med issues in family.    History reviewed. No pertinent surgical history.    Social History     Tobacco Use   • Smoking status: Never   • Smokeless tobacco: Never   • Tobacco comments:     NEVER   Vaping Use   • Vaping status: never used   Substance Use Topics   • Alcohol use: Not Currently   • Drug use: Never     Comment: NEVER       OBJECTIVE:  Visit Vitals  /66 (BP Location: RUE - Right upper extremity, Patient Position: Sitting)   Pulse 72   Resp 16   Ht 5' 4\" (1.626 m)   Wt 111.6 kg (246 lb)   SpO2 98%   BMI 42.23 kg/m²         PHYSICAL EXAM:    Constitutional - Vital signs reviewed. No acute distress.  Head and Neck - Unremarkable.  Lymphatic - No significant enlargement of nodes.  Heart - Normal Heart sounds, No JVD.  Pulmonary - Normal Auscultatation without Wheezes, Rhonchii or Bronchial Breathing.  Abdomen - No Organomegaly, tenderness or distension.  Neuro - Grossly unremarkable Cranial Nerves, Motor function.  Rectal - Normal, guiac neg.    RESULTS:    Lab Results Reviewed and   Lab Results   Component Value Date    CHOLESTEROL 185 12/06/2022    HDL 75 12/06/2022    CALCLDL 98 12/06/2022    TRIGLYCERIDE 58 12/06/2022       ASSESSMENT/PLAN:    Results of Tests explained. All questions answered.    Chr Lumbago and Knee pain - Stable. No hope till she looses wt !  Obesity - Do better with diet and exercise !    Refuses Colonoscopy etc !     FU as advised.  Lab before FU visit.    TIME:  Spent 30 minutes with exam, discussion and advice.    Thompson Dong MD   denies

## 2023-05-03 ENCOUNTER — APPOINTMENT (OUTPATIENT)
Dept: DERMATOLOGY | Facility: CLINIC | Age: 68
End: 2023-05-03
Payer: COMMERCIAL

## 2023-05-03 DIAGNOSIS — Z12.83 ENCOUNTER FOR SCREENING FOR MALIGNANT NEOPLASM OF SKIN: ICD-10-CM

## 2023-05-03 DIAGNOSIS — L60.3 NAIL DYSTROPHY: ICD-10-CM

## 2023-05-03 DIAGNOSIS — L81.4 OTHER MELANIN HYPERPIGMENTATION: ICD-10-CM

## 2023-05-03 DIAGNOSIS — D22.9 MELANOCYTIC NEVI, UNSPECIFIED: ICD-10-CM

## 2023-05-03 DIAGNOSIS — L82.0 INFLAMED SEBORRHEIC KERATOSIS: ICD-10-CM

## 2023-05-03 PROCEDURE — 17110 DESTRUCTION B9 LES UP TO 14: CPT

## 2023-05-03 PROCEDURE — 99214 OFFICE O/P EST MOD 30 MIN: CPT | Mod: 25

## 2023-05-03 RX ORDER — GENTAMICIN SULFATE 1 MG/G
0.1 OINTMENT TOPICAL TWICE DAILY
Qty: 2 | Refills: 1 | Status: ACTIVE | COMMUNITY
Start: 2023-05-03 | End: 1900-01-01

## 2023-05-03 NOTE — ASSESSMENT
[FreeTextEntry1] : #Seborrheic Keratosis\par - These growths are benign\par - Related to genetics - these lesions run in families; NOT related to sun exposure\par - No treatment warranted unless inflamed; can use OTC Sarna lotion PRN itch\par \par #Solar lentigines\par - Discussed etiology and benign nature of condition\par - Sun protective measures reinforced. Recommended OTC sunscreen products, including SPF30+ with broadband UV protection as well as proper use.\par \par #ISK\par The specified lesions were treated with liquid nitrogen cryotherapy.  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

## 2023-05-03 NOTE — PHYSICAL EXAM
[Alert] : alert [Oriented x 3] : ~L oriented x 3 [Well Nourished] : well nourished [Conjunctiva Non-injected] : conjunctiva non-injected [No Visual Lymphadenopathy] : no visual  lymphadenopathy [No Clubbing] : no clubbing [No Bromhidrosis] : no bromhidrosis [No Chromhidrosis] : no chromhidrosis [Declined] : declined [FreeTextEntry3] : AAOx3, pleasant, NAD, no visual lymphadenopathy\par hair, scalp, face, nose, eyelids, ears, lips, oropharynx, neck, chest, abdomen, back, right arm, left arm, nails, and hands examined with all normal findings,\par pertinent findings include:\par \par - lentigines and SKs scattered on arms\par - bl thumbnails with focal nailbed dystrophy, yellowing, thickening\par - 1st toenails bl with distal half onychauxis and subungual debris with yellowing\par multiple benign nevi and lentigines\par + inflamed, waxy, keratotic papule; on scalp

## 2023-05-03 NOTE — HISTORY OF PRESENT ILLNESS
[FreeTextEntry1] : nail fungus; spots on face [de-identified] : Mr. Pierre is a 67 year old M with hx of diverticulitis and complicated hernia repair here for f/u followup of below. \par LV Sept 2022 for FBSE\par \par # Nail fungus, bl thumbnails. Using ciclopirox 8% solution for >1 year, without relief. [place] not covered with insurance \par # AKs on face, s/p cryotherapy at  with improvement. \par spot on scalp. enlarging. \par No itchy, growing, bleeding, painful, or changing moles. \par \par Derm Hx: AKs; [place]'s (biopsy in 6/4/2020); stasis dermatitis with id reaction\par Personal hx of skin cancer: none\par FHx of skin cancer: none\par Social Hx: retired \par \par

## 2023-05-03 NOTE — REVIEW OF SYSTEMS
[Fever] : no fever [Chills] : no chills [Vomiting] : no vomiting [Bloody Stool] : no bloody stool [Shortness Of Breath] : no shortness of breath [Negative] : Genitourinary

## 2023-07-27 ENCOUNTER — APPOINTMENT (OUTPATIENT)
Dept: INTERNAL MEDICINE | Facility: CLINIC | Age: 68
End: 2023-07-27
Payer: COMMERCIAL

## 2023-07-27 ENCOUNTER — NON-APPOINTMENT (OUTPATIENT)
Age: 68
End: 2023-07-27

## 2023-07-27 VITALS
WEIGHT: 216 LBS | DIASTOLIC BLOOD PRESSURE: 76 MMHG | BODY MASS INDEX: 29.26 KG/M2 | TEMPERATURE: 97.9 F | OXYGEN SATURATION: 96 % | SYSTOLIC BLOOD PRESSURE: 139 MMHG | HEIGHT: 72 IN | HEART RATE: 64 BPM

## 2023-07-27 VITALS — DIASTOLIC BLOOD PRESSURE: 76 MMHG | SYSTOLIC BLOOD PRESSURE: 128 MMHG

## 2023-07-27 DIAGNOSIS — Z12.11 ENCOUNTER FOR SCREENING FOR MALIGNANT NEOPLASM OF COLON: ICD-10-CM

## 2023-07-27 DIAGNOSIS — E78.00 PURE HYPERCHOLESTEROLEMIA, UNSPECIFIED: ICD-10-CM

## 2023-07-27 DIAGNOSIS — Z00.00 ENCOUNTER FOR GENERAL ADULT MEDICAL EXAMINATION W/OUT ABNORMAL FINDINGS: ICD-10-CM

## 2023-07-27 DIAGNOSIS — K52.9 NONINFECTIVE GASTROENTERITIS AND COLITIS, UNSPECIFIED: ICD-10-CM

## 2023-07-27 DIAGNOSIS — R79.89 OTHER SPECIFIED ABNORMAL FINDINGS OF BLOOD CHEMISTRY: ICD-10-CM

## 2023-07-27 PROCEDURE — 99397 PER PM REEVAL EST PAT 65+ YR: CPT | Mod: 25

## 2023-07-27 PROCEDURE — 36415 COLL VENOUS BLD VENIPUNCTURE: CPT

## 2023-07-27 PROCEDURE — 93000 ELECTROCARDIOGRAM COMPLETE: CPT

## 2023-07-31 LAB
25(OH)D3 SERPL-MCNC: 29.5 NG/ML
ALBUMIN SERPL ELPH-MCNC: 4.9 G/DL
ALP BLD-CCNC: 52 U/L
ALT SERPL-CCNC: 17 U/L
ANION GAP SERPL CALC-SCNC: 14 MMOL/L
AST SERPL-CCNC: 14 U/L
BILIRUB SERPL-MCNC: 0.6 MG/DL
BUN SERPL-MCNC: 30 MG/DL
CALCIUM SERPL-MCNC: 9.9 MG/DL
CHLORIDE SERPL-SCNC: 104 MMOL/L
CHOLEST SERPL-MCNC: 190 MG/DL
CO2 SERPL-SCNC: 24 MMOL/L
CREAT SERPL-MCNC: 1.04 MG/DL
CRP SERPL-MCNC: <3 MG/L
EGFR: 77 ML/MIN/1.73M2
ESTIMATED AVERAGE GLUCOSE: 108 MG/DL
FERRITIN SERPL-MCNC: 606 NG/ML
FOLATE SERPL-MCNC: 12.6 NG/ML
GLUCOSE SERPL-MCNC: 116 MG/DL
HBA1C MFR BLD HPLC: 5.4 %
HCT VFR BLD CALC: 49.5 %
HDLC SERPL-MCNC: 73 MG/DL
HGB BLD-MCNC: 15.8 G/DL
IRON SATN MFR SERPL: 42 %
IRON SERPL-MCNC: 116 UG/DL
LDLC SERPL CALC-MCNC: 98 MG/DL
MCHC RBC-ENTMCNC: 29.9 PG
MCHC RBC-ENTMCNC: 31.9 GM/DL
MCV RBC AUTO: 93.6 FL
NONHDLC SERPL-MCNC: 117 MG/DL
PLATELET # BLD AUTO: 204 K/UL
POTASSIUM SERPL-SCNC: 4.6 MMOL/L
PROT SERPL-MCNC: 7.3 G/DL
PSA SERPL-MCNC: 0.57 NG/ML
RBC # BLD: 5.29 M/UL
RBC # FLD: 13.2 %
SODIUM SERPL-SCNC: 142 MMOL/L
TIBC SERPL-MCNC: 276 UG/DL
TRIGL SERPL-MCNC: 103 MG/DL
TSH SERPL-ACNC: 2 UIU/ML
UIBC SERPL-MCNC: 160 UG/DL
VIT B12 SERPL-MCNC: 226 PG/ML
WBC # FLD AUTO: 4.69 K/UL

## 2023-08-15 LAB — FERRITIN SERPL-MCNC: 570 NG/ML

## 2023-09-15 ENCOUNTER — OUTPATIENT (OUTPATIENT)
Dept: OUTPATIENT SERVICES | Facility: HOSPITAL | Age: 68
LOS: 1 days | Discharge: HOME | End: 2023-09-15

## 2023-09-15 DIAGNOSIS — Z98.890 OTHER SPECIFIED POSTPROCEDURAL STATES: Chronic | ICD-10-CM

## 2023-09-15 DIAGNOSIS — Z87.19 PERSONAL HISTORY OF OTHER DISEASES OF THE DIGESTIVE SYSTEM: Chronic | ICD-10-CM

## 2023-09-15 DIAGNOSIS — R79.89 OTHER SPECIFIED ABNORMAL FINDINGS OF BLOOD CHEMISTRY: ICD-10-CM

## 2023-09-15 DIAGNOSIS — Z90.49 ACQUIRED ABSENCE OF OTHER SPECIFIED PARTS OF DIGESTIVE TRACT: Chronic | ICD-10-CM

## 2023-09-15 DIAGNOSIS — Z90.89 ACQUIRED ABSENCE OF OTHER ORGANS: Chronic | ICD-10-CM

## 2023-09-18 ENCOUNTER — NON-APPOINTMENT (OUTPATIENT)
Age: 68
End: 2023-09-18

## 2023-09-18 ENCOUNTER — RESULT REVIEW (OUTPATIENT)
Age: 68
End: 2023-09-18

## 2023-09-18 ENCOUNTER — APPOINTMENT (OUTPATIENT)
Dept: HEMATOLOGY ONCOLOGY | Facility: CLINIC | Age: 68
End: 2023-09-18
Payer: COMMERCIAL

## 2023-09-18 VITALS
WEIGHT: 216.05 LBS | OXYGEN SATURATION: 96 % | RESPIRATION RATE: 16 BRPM | BODY MASS INDEX: 29.59 KG/M2 | HEIGHT: 71.5 IN | HEART RATE: 58 BPM | DIASTOLIC BLOOD PRESSURE: 80 MMHG | TEMPERATURE: 98 F | SYSTOLIC BLOOD PRESSURE: 146 MMHG

## 2023-09-18 DIAGNOSIS — Z86.19 PERSONAL HISTORY OF OTHER INFECTIOUS AND PARASITIC DISEASES: ICD-10-CM

## 2023-09-18 DIAGNOSIS — R10.9 UNSPECIFIED ABDOMINAL PAIN: ICD-10-CM

## 2023-09-18 DIAGNOSIS — K52.9 NONINFECTIVE GASTROENTERITIS AND COLITIS, UNSPECIFIED: ICD-10-CM

## 2023-09-18 DIAGNOSIS — R19.7 DIARRHEA, UNSPECIFIED: ICD-10-CM

## 2023-09-18 DIAGNOSIS — R50.9 FEVER, UNSPECIFIED: ICD-10-CM

## 2023-09-18 DIAGNOSIS — L03.311 CELLULITIS OF ABDOMINAL WALL: ICD-10-CM

## 2023-09-18 LAB
BASOPHILS # BLD AUTO: 0.03 K/UL — SIGNIFICANT CHANGE UP (ref 0–0.2)
BASOPHILS NFR BLD AUTO: 0.8 % — SIGNIFICANT CHANGE UP (ref 0–2)
EOSINOPHIL # BLD AUTO: 0.05 K/UL — SIGNIFICANT CHANGE UP (ref 0–0.5)
EOSINOPHIL NFR BLD AUTO: 1.3 % — SIGNIFICANT CHANGE UP (ref 0–6)
HCT VFR BLD CALC: 46.8 % — SIGNIFICANT CHANGE UP (ref 39–50)
HGB BLD-MCNC: 15.7 G/DL — SIGNIFICANT CHANGE UP (ref 13–17)
IMM GRANULOCYTES NFR BLD AUTO: 0.3 % — SIGNIFICANT CHANGE UP (ref 0–0.9)
LYMPHOCYTES # BLD AUTO: 1.33 K/UL — SIGNIFICANT CHANGE UP (ref 1–3.3)
LYMPHOCYTES # BLD AUTO: 33.5 % — SIGNIFICANT CHANGE UP (ref 13–44)
MCHC RBC-ENTMCNC: 30.7 PG — SIGNIFICANT CHANGE UP (ref 27–34)
MCHC RBC-ENTMCNC: 33.5 G/DL — SIGNIFICANT CHANGE UP (ref 32–36)
MCV RBC AUTO: 91.6 FL — SIGNIFICANT CHANGE UP (ref 80–100)
MONOCYTES # BLD AUTO: 0.46 K/UL — SIGNIFICANT CHANGE UP (ref 0–0.9)
MONOCYTES NFR BLD AUTO: 11.6 % — SIGNIFICANT CHANGE UP (ref 2–14)
NEUTROPHILS # BLD AUTO: 2.09 K/UL — SIGNIFICANT CHANGE UP (ref 1.8–7.4)
NEUTROPHILS NFR BLD AUTO: 52.5 % — SIGNIFICANT CHANGE UP (ref 43–77)
NRBC # BLD: 0 /100 WBCS — SIGNIFICANT CHANGE UP (ref 0–0)
PLATELET # BLD AUTO: 201 K/UL — SIGNIFICANT CHANGE UP (ref 150–400)
RBC # BLD: 5.11 M/UL — SIGNIFICANT CHANGE UP (ref 4.2–5.8)
RBC # FLD: 12.4 % — SIGNIFICANT CHANGE UP (ref 10.3–14.5)
WBC # BLD: 3.97 K/UL — SIGNIFICANT CHANGE UP (ref 3.8–10.5)
WBC # FLD AUTO: 3.97 K/UL — SIGNIFICANT CHANGE UP (ref 3.8–10.5)

## 2023-09-18 PROCEDURE — 99205 OFFICE O/P NEW HI 60 MIN: CPT

## 2023-09-19 LAB — ERYTHROCYTE [SEDIMENTATION RATE] IN BLOOD: 9 MM/HR — SIGNIFICANT CHANGE UP (ref 0–20)

## 2023-10-08 LAB
CRP SERPL-MCNC: <3 MG/L
FERRITIN SERPL-MCNC: 455 NG/ML
IRON SATN MFR SERPL: 40 %
IRON SERPL-MCNC: 109 UG/DL
TIBC SERPL-MCNC: 273 UG/DL
TM INTERPRETATION: NORMAL
UIBC SERPL-MCNC: 164 UG/DL
VIT B12 SERPL-MCNC: 286 PG/ML

## 2023-10-09 DIAGNOSIS — E53.8 DEFICIENCY OF OTHER SPECIFIED B GROUP VITAMINS: ICD-10-CM

## 2023-11-30 LAB — FERRITIN SERPL-MCNC: 536 NG/ML

## 2023-12-01 LAB
IRON SATN MFR SERPL: 43 %
IRON SERPL-MCNC: 116 UG/DL
TIBC SERPL-MCNC: 269 UG/DL
UIBC SERPL-MCNC: 153 UG/DL

## 2023-12-06 LAB
BASOPHILS # BLD AUTO: 0.03 K/UL
BASOPHILS NFR BLD AUTO: 0.6 %
EOSINOPHIL # BLD AUTO: 0.09 K/UL
EOSINOPHIL NFR BLD AUTO: 1.9 %
HCT VFR BLD CALC: 48.1 %
HGB BLD-MCNC: 15.1 G/DL
IMM GRANULOCYTES NFR BLD AUTO: 0.4 %
LYMPHOCYTES # BLD AUTO: 1.87 K/UL
LYMPHOCYTES NFR BLD AUTO: 40.1 %
MAN DIFF?: NORMAL
MCHC RBC-ENTMCNC: 30.1 PG
MCHC RBC-ENTMCNC: 31.4 GM/DL
MCV RBC AUTO: 95.8 FL
MONOCYTES # BLD AUTO: 0.49 K/UL
MONOCYTES NFR BLD AUTO: 10.5 %
NEUTROPHILS # BLD AUTO: 2.16 K/UL
NEUTROPHILS NFR BLD AUTO: 46.5 %
PLATELET # BLD AUTO: 198 K/UL
RBC # BLD: 5.02 M/UL
RBC # FLD: 13.7 %
VIT B12 SERPL-MCNC: 454 PG/ML
WBC # FLD AUTO: 4.66 K/UL

## 2024-06-04 DIAGNOSIS — R79.89 OTHER SPECIFIED ABNORMAL FINDINGS OF BLOOD CHEMISTRY: ICD-10-CM

## 2024-06-21 LAB
BASOPHILS # BLD AUTO: 0.03 K/UL
BASOPHILS NFR BLD AUTO: 0.6 %
EOSINOPHIL # BLD AUTO: 0.12 K/UL
EOSINOPHIL NFR BLD AUTO: 2.6 %
FERRITIN SERPL-MCNC: 503 NG/ML
HCT VFR BLD CALC: 46.1 %
HGB BLD-MCNC: 15.4 G/DL
IMM GRANULOCYTES NFR BLD AUTO: 0.6 %
IRON SATN MFR SERPL: 37 %
IRON SERPL-MCNC: 106 UG/DL
LYMPHOCYTES # BLD AUTO: 1.91 K/UL
LYMPHOCYTES NFR BLD AUTO: 41.1 %
MAN DIFF?: NORMAL
MCHC RBC-ENTMCNC: 30.6 PG
MCHC RBC-ENTMCNC: 33.4 GM/DL
MCV RBC AUTO: 91.5 FL
MONOCYTES # BLD AUTO: 0.47 K/UL
MONOCYTES NFR BLD AUTO: 10.1 %
NEUTROPHILS # BLD AUTO: 2.09 K/UL
NEUTROPHILS NFR BLD AUTO: 45 %
PLATELET # BLD AUTO: 197 K/UL
RBC # BLD: 5.04 M/UL
RBC # FLD: 13.1 %
TIBC SERPL-MCNC: 288 UG/DL
UIBC SERPL-MCNC: 181 UG/DL
VIT B12 SERPL-MCNC: 993 PG/ML
WBC # FLD AUTO: 4.65 K/UL

## 2024-06-25 LAB — VIT B2 SERPL-MCNC: 254 UG/L

## 2024-09-30 ENCOUNTER — APPOINTMENT (OUTPATIENT)
Dept: INTERNAL MEDICINE | Facility: CLINIC | Age: 69
End: 2024-09-30

## 2024-12-12 ENCOUNTER — NON-APPOINTMENT (OUTPATIENT)
Age: 69
End: 2024-12-12

## 2024-12-12 ENCOUNTER — APPOINTMENT (OUTPATIENT)
Dept: INTERNAL MEDICINE | Facility: CLINIC | Age: 69
End: 2024-12-12
Payer: COMMERCIAL

## 2024-12-12 VITALS
WEIGHT: 228 LBS | OXYGEN SATURATION: 96 % | HEIGHT: 71.5 IN | TEMPERATURE: 97 F | BODY MASS INDEX: 31.22 KG/M2 | HEART RATE: 62 BPM | SYSTOLIC BLOOD PRESSURE: 156 MMHG | DIASTOLIC BLOOD PRESSURE: 79 MMHG

## 2024-12-12 VITALS — DIASTOLIC BLOOD PRESSURE: 90 MMHG | SYSTOLIC BLOOD PRESSURE: 148 MMHG

## 2024-12-12 DIAGNOSIS — E78.00 PURE HYPERCHOLESTEROLEMIA, UNSPECIFIED: ICD-10-CM

## 2024-12-12 DIAGNOSIS — R10.9 UNSPECIFIED ABDOMINAL PAIN: ICD-10-CM

## 2024-12-12 DIAGNOSIS — E53.8 DEFICIENCY OF OTHER SPECIFIED B GROUP VITAMINS: ICD-10-CM

## 2024-12-12 DIAGNOSIS — K52.9 NONINFECTIVE GASTROENTERITIS AND COLITIS, UNSPECIFIED: ICD-10-CM

## 2024-12-12 DIAGNOSIS — R03.0 ELEVATED BLOOD-PRESSURE READING, W/OUT DIAGNOSIS OF HYPERTENSION: ICD-10-CM

## 2024-12-12 DIAGNOSIS — R79.89 OTHER SPECIFIED ABNORMAL FINDINGS OF BLOOD CHEMISTRY: ICD-10-CM

## 2024-12-12 DIAGNOSIS — Z00.00 ENCOUNTER FOR GENERAL ADULT MEDICAL EXAMINATION W/OUT ABNORMAL FINDINGS: ICD-10-CM

## 2024-12-12 DIAGNOSIS — F10.10 ALCOHOL ABUSE, UNCOMPLICATED: ICD-10-CM

## 2024-12-12 PROCEDURE — 99214 OFFICE O/P EST MOD 30 MIN: CPT | Mod: 25

## 2024-12-12 PROCEDURE — 36415 COLL VENOUS BLD VENIPUNCTURE: CPT

## 2024-12-12 PROCEDURE — 93000 ELECTROCARDIOGRAM COMPLETE: CPT

## 2024-12-12 PROCEDURE — 99397 PER PM REEVAL EST PAT 65+ YR: CPT

## 2024-12-13 PROBLEM — R10.9 ABDOMINAL CRAMPS: Status: ACTIVE | Noted: 2024-12-13

## 2024-12-13 PROBLEM — R03.0 BLOOD PRESSURE ELEVATED WITHOUT HISTORY OF HTN: Status: ACTIVE | Noted: 2024-12-13

## 2024-12-16 LAB
25(OH)D3 SERPL-MCNC: 35.7 NG/ML
ALBUMIN SERPL ELPH-MCNC: 4.7 G/DL
ALP BLD-CCNC: 53 U/L
ALT SERPL-CCNC: 24 U/L
ANION GAP SERPL CALC-SCNC: 12 MMOL/L
AST SERPL-CCNC: 21 U/L
BILIRUB SERPL-MCNC: 0.7 MG/DL
BUN SERPL-MCNC: 17 MG/DL
CALCIUM SERPL-MCNC: 9.7 MG/DL
CHLORIDE SERPL-SCNC: 103 MMOL/L
CHOLEST SERPL-MCNC: 215 MG/DL
CO2 SERPL-SCNC: 26 MMOL/L
CREAT SERPL-MCNC: 1.16 MG/DL
EGFR: 67 ML/MIN/1.73M2
ESTIMATED AVERAGE GLUCOSE: 103 MG/DL
FERRITIN SERPL-MCNC: 580 NG/ML
FOLATE SERPL-MCNC: 16.3 NG/ML
GLUCOSE SERPL-MCNC: 94 MG/DL
HBA1C MFR BLD HPLC: 5.2 %
HCT VFR BLD CALC: 49.5 %
HDLC SERPL-MCNC: 66 MG/DL
HGB BLD-MCNC: 16 G/DL
LDLC SERPL CALC-MCNC: 122 MG/DL
MCHC RBC-ENTMCNC: 30.7 PG
MCHC RBC-ENTMCNC: 32.3 G/DL
MCV RBC AUTO: 94.8 FL
NONHDLC SERPL-MCNC: 149 MG/DL
PLATELET # BLD AUTO: 215 K/UL
POTASSIUM SERPL-SCNC: 4.4 MMOL/L
PROT SERPL-MCNC: 7.2 G/DL
PSA SERPL-MCNC: 0.5 NG/ML
RBC # BLD: 5.22 M/UL
RBC # FLD: 12.8 %
SODIUM SERPL-SCNC: 142 MMOL/L
TRIGL SERPL-MCNC: 154 MG/DL
TSH SERPL-ACNC: 2.67 UIU/ML
VIT B12 SERPL-MCNC: 1011 PG/ML
WBC # FLD AUTO: 4.95 K/UL

## 2025-03-07 ENCOUNTER — APPOINTMENT (OUTPATIENT)
Dept: INTERNAL MEDICINE | Facility: CLINIC | Age: 70
End: 2025-03-07
Payer: COMMERCIAL

## 2025-03-07 VITALS
HEIGHT: 71 IN | DIASTOLIC BLOOD PRESSURE: 73 MMHG | BODY MASS INDEX: 30.52 KG/M2 | OXYGEN SATURATION: 96 % | HEART RATE: 64 BPM | SYSTOLIC BLOOD PRESSURE: 170 MMHG | WEIGHT: 218 LBS | TEMPERATURE: 98 F | RESPIRATION RATE: 15 BRPM

## 2025-03-07 VITALS — SYSTOLIC BLOOD PRESSURE: 160 MMHG | DIASTOLIC BLOOD PRESSURE: 74 MMHG

## 2025-03-07 VITALS — DIASTOLIC BLOOD PRESSURE: 77 MMHG | SYSTOLIC BLOOD PRESSURE: 134 MMHG

## 2025-03-07 DIAGNOSIS — E78.00 PURE HYPERCHOLESTEROLEMIA, UNSPECIFIED: ICD-10-CM

## 2025-03-07 DIAGNOSIS — F10.10 ALCOHOL ABUSE, UNCOMPLICATED: ICD-10-CM

## 2025-03-07 DIAGNOSIS — R03.0 ELEVATED BLOOD-PRESSURE READING, W/OUT DIAGNOSIS OF HYPERTENSION: ICD-10-CM

## 2025-03-07 PROCEDURE — 36415 COLL VENOUS BLD VENIPUNCTURE: CPT

## 2025-03-07 PROCEDURE — 99214 OFFICE O/P EST MOD 30 MIN: CPT

## 2025-03-07 PROCEDURE — G2211 COMPLEX E/M VISIT ADD ON: CPT

## 2025-03-08 LAB
CHOLEST SERPL-MCNC: 201 MG/DL
HDLC SERPL-MCNC: 69 MG/DL
LDLC SERPL CALC-MCNC: 113 MG/DL
NONHDLC SERPL-MCNC: 132 MG/DL
TRIGL SERPL-MCNC: 104 MG/DL

## 2025-06-26 NOTE — PACU DISCHARGE NOTE - MENTAL STATUS: PATIENT PARTICIPATION
Pt refused CPAP. Has sleep study in near future per pt. Will continue to follow.     Sincere Hauser, RT on 6/25/2025 at 9:34 PM       Awake

## (undated) DEVICE — VENODYNE/SCD SLEEVE CALF MEDIUM

## (undated) DEVICE — DRAPE 3/4 SHEET W REINFORCEMENT 56X77"

## (undated) DEVICE — DRSG STERISTRIPS 0.5 X 4"

## (undated) DEVICE — DRSG 2X2

## (undated) DEVICE — WARMING BLANKET LOWER ADULT

## (undated) DEVICE — DRSG MASTISOL

## (undated) DEVICE — NDL HYPO SAFE 25G X 1.5" (ORANGE)

## (undated) DEVICE — SYR CONTROL LUER LOK 10CC

## (undated) DEVICE — Device

## (undated) DEVICE — TAPE SILK 1"